# Patient Record
Sex: MALE | Race: WHITE | URBAN - METROPOLITAN AREA
[De-identification: names, ages, dates, MRNs, and addresses within clinical notes are randomized per-mention and may not be internally consistent; named-entity substitution may affect disease eponyms.]

---

## 2021-02-16 ENCOUNTER — HOSPITAL ENCOUNTER (OUTPATIENT)
Dept: RADIOLOGY | Facility: MEDICAL CENTER | Age: 26
Discharge: HOME | End: 2021-02-16
Payer: COMMERCIAL

## 2021-02-16 DIAGNOSIS — R09.81 NASAL CONGESTION: ICD-10-CM

## 2021-02-16 DIAGNOSIS — J34.3 HYPERTROPHY OF INFERIOR NASAL TURBINATE: ICD-10-CM

## 2021-02-16 DIAGNOSIS — J32.2 CHRONIC ETHMOIDAL SINUSITIS: ICD-10-CM

## 2021-02-16 DIAGNOSIS — J34.2 DEVIATED NASAL SEPTUM: ICD-10-CM

## 2021-02-16 DIAGNOSIS — J45.40 MODERATE PERSISTENT ASTHMA WITHOUT COMPLICATION: ICD-10-CM

## 2021-02-16 DIAGNOSIS — J30.89 NON-SEASONAL ALLERGIC RHINITIS DUE TO OTHER ALLERGIC TRIGGER: ICD-10-CM

## 2021-02-16 DIAGNOSIS — J32.1 CHRONIC FRONTAL SINUSITIS: ICD-10-CM

## 2021-02-16 DIAGNOSIS — J33.9 NASAL POLYPOSIS: ICD-10-CM

## 2021-02-16 DIAGNOSIS — J32.3 CHRONIC SPHENOIDAL SINUSITIS: ICD-10-CM

## 2021-02-16 DIAGNOSIS — J32.0 CHRONIC MAXILLARY SINUSITIS: ICD-10-CM

## 2021-02-16 PROCEDURE — 70486 CT MAXILLOFACIAL W/O DYE: CPT

## 2021-02-16 PROCEDURE — G1004 CDSM NDSC: HCPCS

## 2021-03-31 DIAGNOSIS — Z23 ENCOUNTER FOR IMMUNIZATION: ICD-10-CM

## 2021-04-07 ENCOUNTER — IMMUNIZATIONS (OUTPATIENT)
Dept: FAMILY MEDICINE CLINIC | Facility: HOSPITAL | Age: 26
End: 2021-04-07

## 2021-04-07 DIAGNOSIS — Z23 ENCOUNTER FOR IMMUNIZATION: Primary | ICD-10-CM

## 2021-04-07 PROCEDURE — 91300 SARS-COV-2 / COVID-19 MRNA VACCINE (PFIZER-BIONTECH) 30 MCG: CPT

## 2021-04-07 PROCEDURE — 0001A SARS-COV-2 / COVID-19 MRNA VACCINE (PFIZER-BIONTECH) 30 MCG: CPT

## 2021-04-28 ENCOUNTER — IMMUNIZATIONS (OUTPATIENT)
Dept: FAMILY MEDICINE CLINIC | Facility: HOSPITAL | Age: 26
End: 2021-04-28

## 2021-04-28 DIAGNOSIS — Z23 ENCOUNTER FOR IMMUNIZATION: Primary | ICD-10-CM

## 2021-04-28 PROCEDURE — 91300 SARS-COV-2 / COVID-19 MRNA VACCINE (PFIZER-BIONTECH) 30 MCG: CPT

## 2021-04-28 PROCEDURE — 0002A SARS-COV-2 / COVID-19 MRNA VACCINE (PFIZER-BIONTECH) 30 MCG: CPT

## 2022-01-04 ENCOUNTER — TELEMEDICINE (OUTPATIENT)
Dept: FAMILY MEDICINE CLINIC | Facility: CLINIC | Age: 27
End: 2022-01-04
Payer: COMMERCIAL

## 2022-01-04 DIAGNOSIS — J32.9 SINUSITIS, UNSPECIFIED CHRONICITY, UNSPECIFIED LOCATION: Primary | ICD-10-CM

## 2022-01-04 PROCEDURE — 99203 OFFICE O/P NEW LOW 30 MIN: CPT | Performed by: FAMILY MEDICINE

## 2022-01-04 RX ORDER — AZITHROMYCIN 250 MG/1
TABLET, FILM COATED ORAL
Qty: 6 TABLET | Refills: 0 | Status: SHIPPED | OUTPATIENT
Start: 2022-01-04 | End: 2022-01-09

## 2022-01-04 NOTE — ASSESSMENT & PLAN NOTE
Patient with history of recurrent sinusitis reporting symptoms starting 12/29/2021  After discussion with patient, this is unlikely to be COVID  Patient get tested for COVID weekly at his work, last test negative on 12/20/21    Will treat this as the sinusitis infection  Prescribed azithromycin 500 mg daily For 1st day and 250 mg daily for the next 4 days    Symptom onset: 12/29/21  Date of exposure:   Date of positive test: n/a    Symptoms includes: nasal congestion, cough, chest tightness and     Please maintain appropriate social distancing, wear a mask at all public spaces, wash hands frequently and clean surface after use  If you have fever greater than 104° that does not respond to Tylenol, difficulty eating or drinking, difficulty breathing please report to ER for evaluation

## 2022-01-04 NOTE — PROGRESS NOTES
COVID-19 Outpatient Progress Note    Assessment/Plan:    Problem List Items Addressed This Visit        Respiratory    Sinusitis - Primary     Patient with history of recurrent sinusitis reporting symptoms starting 12/29/2021  After discussion with patient, this is unlikely to be COVID  Patient get tested for COVID weekly at his work, last test negative on 12/20/21    Will treat this as the sinusitis infection  Prescribed azithromycin 500 mg daily For 1st day and 250 mg daily for the next 4 days    Symptom onset: 12/29/21  Date of exposure:   Date of positive test: n/a    Symptoms includes: nasal congestion, cough, chest tightness and     Please maintain appropriate social distancing, wear a mask at all public spaces, wash hands frequently and clean surface after use  If you have fever greater than 104° that does not respond to Tylenol, difficulty eating or drinking, difficulty breathing please report to ER for evaluation               Relevant Medications    azithromycin (Zithromax) 250 mg tablet         Disposition:     Patient is fully vaccinated and I recommended self quarantine for 5 days followed by strict mask use for an additional 5 days  If patient were to develop symptoms, they should immediately self isolate and call our office for further guidance  I have spent 15 minutes directly with the patient  Encounter provider Lynette Mcintosh MD    Provider located at [address] [place] [address]  [phone]    Recent Visits  Date Type Provider Dept   01/04/22 Telemedicine Lynette Mcintosh MD  Ver[facility]    Showing recent visits within past 7 days and meeting all other requirements  Future Appointments  No visits were found meeting these conditions    Showing future appointments within next 150 days and meeting all other requirements     This virtual check-in was done via [facility] and patient was informed that this is a secure, HIPAA-compliant platform  He agrees to proceed  Patient agrees to participate in a virtual check in via telephone or video visit instead of presenting to the office to address urgent/immediate medical needs  Patient is aware this is a billable service  After connecting through [facility], the patient was identified by name and date of birth  Monet Cee was informed that this was a telemedicine visit and that the exam was being conducted confidentially over secure lines  My office door was closed  No one else was in the room  Monet Cee acknowledged consent and understanding of privacy and security of the telemedicine visit  I informed the patient that I have reviewed his record in Epic and presented the opportunity for him to ask any questions regarding the visit today  The patient agreed to participate  Verification of patient location:  Patient is located in the following state in which I hold an active license: [place]    Subjective:   Monet Cee is a 23 y o  male who is concerned about COVID-19  Patient's symptoms include nasal congestion, cough, chest tightness and headache  Patient denies fever, chills, fatigue, malaise, rhinorrhea, sore throat, anosmia, loss of taste, shortness of breath, abdominal pain, nausea, vomiting, diarrhea and myalgias       Date of symptom onset: 12/29/2021  COVID-19 vaccination status: Fully vaccinated (primary series)    Exposure:   Contact with a person who is under investigation (PUI) for or who is positive for COVID-19 within the last 14 days?: No    Hospitalized recently for fever and/or lower respiratory symptoms?: No      Currently a healthcare worker that is involved in direct patient care?: No      Works in a special setting where the risk of COVID-19 transmission may be high? (this may include long-term care, correctional and penitentiary facilities; homeless shelters; assisted-living facilities and group homes ): No      Resident in a special setting where the risk of COVID-19 transmission may be high? (this may include long-term care, correctional and intermediate facilities; homeless shelters; assisted-living facilities and group homes ): No      Has baseline asthma, flaring up  Using albuterol inhaler up to once daily  Prior to illness, hardly using albuterol at all  Pt was in contact with individuals with symptoms of "food poisoning" throwing up a lot 12/3/21, 1/1/22  Patient reports frequent sinus infection, sees an ENT  Uses antibiotic last time around spring of 2021  Denies any allergy to antibiotic  Patient get tested for COVID weekly for work, last tested 12/20/21  No results found for: SARSCOV2, [address], SARSCORONAVI, CORONAVIRUSR, [facility]  Past Medical History:   Diagnosis Date    Asthma     Nasal congestion     Sinusitis     Tonsillitis      Past Surgical History:   Procedure Laterality Date    ADENOIDECTOMY      COSMETIC SURGERY      Rhinoplasty    POLYPECTOMY      SEPTOPLASTY      SINUS SURGERY      TONSILLECTOMY       Current Outpatient Medications   Medication Sig Dispense Refill    azithromycin (Zithromax) 250 mg tablet Take 2 tablets (500 mg total) by mouth daily for 1 day, THEN 1 tablet (250 mg total) daily for 4 days  6 tablet 0    cetirizine (ZyrTEC) 10 mg tablet Take 1 tablet (10 mg total) by mouth daily at bedtime 30 tablet 5    fluticasone-vilanterol (BREO ELLIPTA) 100-25 mcg/inh inhaler Inhale 1 puff      montelukast (SINGULAIR) 10 mg tablet Take 1 tablet (10 mg total) by mouth daily 90 tablet 0     No current facility-administered medications for this visit  Allergies   Allergen Reactions    Other      Seasonal       Review of Systems   Constitutional: Negative for chills, fatigue and fever  HENT: Positive for congestion  Negative for rhinorrhea and sore throat  Respiratory: Positive for cough and chest tightness  Negative for shortness of breath      Gastrointestinal: Negative for abdominal pain, diarrhea, nausea and vomiting  Musculoskeletal: Negative for myalgias  Neurological: Positive for headaches  Objective: There were no vitals filed for this visit  Physical Exam  Constitutional:       Appearance: Normal appearance  Comments: Nasal sounding voice   Cardiovascular:      Rate and Rhythm: Normal rate  Neurological:      Mental Status: He is alert  Psychiatric:         Mood and Affect: Mood normal        VIRTUAL VISIT DISCLAIMER    Noelle Carpenter verbally agrees to participate in [employer]  Pt is aware that [employer] could be limited without vital signs or the ability to perform a full hands-on physical Lawrnce [place]ers understands he or the provider may request at any time to terminate the video visit and request the patient to seek care or treatment in person  DOMINGA Mart  Family Medicine    Please excuse any "sound-alike" errors that may have ocurred during the process of dictation  Parts of this note have been dictated and there may be errors present in the transcription process  Thank you

## 2022-01-22 ENCOUNTER — OFFICE VISIT (OUTPATIENT)
Dept: URGENT CARE | Facility: MEDICAL CENTER | Age: 27
End: 2022-01-22
Payer: COMMERCIAL

## 2022-01-22 VITALS — WEIGHT: 170 LBS | HEIGHT: 76 IN | BODY MASS INDEX: 20.7 KG/M2

## 2022-01-22 DIAGNOSIS — J45.21 MILD INTERMITTENT ASTHMA WITH ACUTE EXACERBATION: Primary | ICD-10-CM

## 2022-01-22 PROCEDURE — S9083 URGENT CARE CENTER GLOBAL: HCPCS | Performed by: PHYSICIAN ASSISTANT

## 2022-01-22 PROCEDURE — G0382 LEV 3 HOSP TYPE B ED VISIT: HCPCS | Performed by: PHYSICIAN ASSISTANT

## 2022-01-22 RX ORDER — FLUTICASONE FUROATE AND VILANTEROL TRIFENATATE 100; 25 UG/1; UG/1
1 POWDER RESPIRATORY (INHALATION) DAILY
Qty: 60 BLISTER | Refills: 0 | Status: SHIPPED | OUTPATIENT
Start: 2022-01-22 | End: 2022-04-20

## 2022-01-22 RX ORDER — ALBUTEROL SULFATE 90 UG/1
2 AEROSOL, METERED RESPIRATORY (INHALATION) EVERY 6 HOURS PRN
Qty: 8.5 G | Refills: 0 | Status: SHIPPED | OUTPATIENT
Start: 2022-01-22 | End: 2022-02-11

## 2022-01-22 NOTE — PROGRESS NOTES
[facility]        NAME: April Chacon is a 23 y o  male  : 1995    MRN: [id number]  DATE: 2022  TIME: 11:19 AM    Assessment and Plan   Mild intermittent asthma with acute exacerbation [J45 21]  1  Mild intermittent asthma with acute exacerbation  fluticasone-vilanterol (Breo Ellipta) 100-25 mcg/inh inhaler    albuterol (ProAir HFA) 90 mcg/act inhaler         Patient Instructions     Asthma exacerbation/ cough  breo as directed  Ventolin as needed  Declined covid test at this time  Follow up with PCP in 3-5 days  Proceed to  ER if symptoms worsen  Chief Complaint     Chief Complaint   Patient presents with    Cough     Has asthma and hard to breath past week due to cough and restricted airways          History of Present Illness       52-year-old male presents complaining of nonproductive cough, wheezing, congestion x3 days  Patient states he had similar symptoms with previous episodes of asthma exacerbation  Patient declined COVID test at this time      Review of Systems   Review of Systems   Constitutional: Negative  HENT: Negative  Eyes: Negative  Respiratory: Positive for cough, shortness of breath and wheezing  Negative for apnea, choking, chest tightness and stridor  Cardiovascular: Negative  Negative for chest pain           Current Medications       Current Outpatient Medications:     cetirizine (ZyrTEC) 10 mg tablet, Take 1 tablet (10 mg total) by mouth daily at bedtime, Disp: 30 tablet, Rfl: 5    fluticasone-vilanterol (BREO ELLIPTA) 100-25 mcg/inh inhaler, Inhale 1 puff, Disp: , Rfl:     montelukast (SINGULAIR) 10 mg tablet, Take 1 tablet (10 mg total) by mouth daily, Disp: 90 tablet, Rfl: 0    albuterol (ProAir HFA) 90 mcg/act inhaler, Inhale 2 puffs every 6 (six) hours as needed for wheezing for up to 20 days, Disp: 8 5 g, Rfl: 0    fluticasone-vilanterol (Breo Ellipta) 100-25 mcg/inh inhaler, Inhale 1 puff daily Rinse mouth after use , Disp: 60 blister, Rfl: 0    Current Allergies     Allergies as of 01/22/2022 - Reviewed 01/22/2022   Allergen Reaction Noted    Other  12/30/2020            The following portions of the patient's history were reviewed and updated as appropriate: allergies, current medications, past family history, past medical history, past social history, past surgical history and problem list      Past Medical History:   Diagnosis Date    Asthma     Nasal congestion     Sinusitis     Tonsillitis        Past Surgical History:   Procedure Laterality Date    ADENOIDECTOMY      COSMETIC SURGERY      Rhinoplasty    POLYPECTOMY      SEPTOPLASTY      SINUS SURGERY      TONSILLECTOMY         History reviewed  No pertinent family history  Medications have been verified  Objective   Ht 6' 4" (1 93 m)   Wt 77 1 kg (170 lb)   BMI 20 69 kg/m²        Physical Exam     Physical Exam  Constitutional:       General: He is not in acute distress  Appearance: He is well-developed and normal weight  He is not diaphoretic  Cardiovascular:      Rate and Rhythm: Normal rate and regular rhythm  Heart sounds: Normal heart sounds  Pulmonary:      Effort: Pulmonary effort is normal  No respiratory distress  Breath sounds: Normal breath sounds  No stridor  No wheezing, rhonchi or rales  Chest:      Chest wall: No tenderness  Musculoskeletal:      Cervical back: Normal range of motion and neck supple  Lymphadenopathy:      Cervical: No cervical adenopathy  Neurological:      Mental Status: He is alert

## 2022-01-22 NOTE — PATIENT INSTRUCTIONS
Asthma exacerbation/ cough  breo as directed  Ventolin as needed  Declined covid test at this time  Follow up with PCP in 3-5 days  Proceed to  ER if symptoms worsen

## 2022-02-03 ENCOUNTER — OFFICE VISIT (OUTPATIENT)
Dept: FAMILY MEDICINE CLINIC | Facility: CLINIC | Age: 27
End: 2022-02-03
Payer: COMMERCIAL

## 2022-02-03 VITALS
DIASTOLIC BLOOD PRESSURE: 80 MMHG | HEIGHT: 76 IN | WEIGHT: 169.6 LBS | SYSTOLIC BLOOD PRESSURE: 128 MMHG | BODY MASS INDEX: 20.65 KG/M2 | TEMPERATURE: 97.4 F | HEART RATE: 71 BPM | OXYGEN SATURATION: 99 %

## 2022-02-03 DIAGNOSIS — Z00.00 ANNUAL PHYSICAL EXAM: Primary | ICD-10-CM

## 2022-02-03 DIAGNOSIS — J30.2 SEASONAL ALLERGIC RHINITIS, UNSPECIFIED TRIGGER: ICD-10-CM

## 2022-02-03 DIAGNOSIS — J32.9 SINUSITIS, UNSPECIFIED CHRONICITY, UNSPECIFIED LOCATION: ICD-10-CM

## 2022-02-03 DIAGNOSIS — J45.40 MODERATE PERSISTENT ASTHMA, UNSPECIFIED WHETHER COMPLICATED: ICD-10-CM

## 2022-02-03 PROCEDURE — 99395 PREV VISIT EST AGE 18-39: CPT | Performed by: FAMILY MEDICINE

## 2022-02-03 NOTE — ASSESSMENT & PLAN NOTE
Annual physical exam completed at this time  Baseline [place] PSTF, patient does not require any blood work at this time  Discussed safe sex, drug, alcohol, tobacco use  Patient declined HIV and hepatitis-C screening at this time

## 2022-02-03 NOTE — PATIENT INSTRUCTIONS

## 2022-02-03 NOTE — ASSESSMENT & PLAN NOTE
Improved  Has baseline moderate to severe asthma which is controlled on Breo Ellipta and albuterol  Has not required albuterol inhaler at all for the last 2 weeks  Please continue current medication for asthma control  Will provide referral for allergist for evaluation of chronic sinusitis

## 2022-02-03 NOTE — PROGRESS NOTES
[facility]    NAME: Donna Cheney  AGE: 23 y o  SEX: male  : 1995     DATE: 2/3/2022     Assessment and Plan:     Problem List Items Addressed This Visit        Respiratory    Sinusitis     Improved  Has baseline moderate to severe asthma which is controlled on Breo Ellipta and albuterol  Has not required albuterol inhaler at all for the last 2 weeks  Please continue current medication for asthma control  Will provide referral for allergist for evaluation of chronic sinusitis            Other    Annual physical exam - Primary     Annual physical exam completed at this time  Baseline [place]  [place] PSTF, patient does not require any blood work at this time  Discussed safe sex, drug, alcohol, tobacco use  Patient declined HIV and hepatitis-C screening at this time           Other Visit Diagnoses     Seasonal allergic rhinitis, unspecified trigger        Relevant Orders    Ambulatory Referral to Allergy    Moderate persistent asthma, unspecified whether complicated        Relevant Orders    Ambulatory Referral to Allergy          Immunizations and preventive care screenings were discussed with patient today  Appropriate education was printed on patient's after visit summary  Counseling:  Alcohol/drug use: discussed moderation in alcohol intake, the recommendations for healthy alcohol use, and avoidance of illicit drug use  Dental Health: discussed importance of regular tooth brushing, flossing, and dental visits  Injury prevention: discussed safety/seat belts, safety helmets, smoke detectors, carbon dioxide detectors, and smoking near bedding or upholstery  Sexual health: discussed sexually transmitted diseases, partner selection, use of condoms, avoidance of unintended pregnancy, and contraceptive alternatives  · Exercise: the importance of regular exercise/physical activity was discussed   Recommend exercise 3-5 times per week for at least 30 minutes  No follow-ups on file  Chief Complaint:     Chief Complaint   Patient presents with    Follow-up     to [facility] [place]      History of Present Illness:     Adult Annual Physical   Patient here for a comprehensive physical exam  The patient reports Sinusitis, improving  Diet and Physical Activity  · Diet/Nutrition: well balanced diet and consuming 3-5 servings of fruits/vegetables daily  · Exercise: 1-2 times a week on average  Depression Screening  PHQ-2/9 Depression Screening    Little interest or pleasure in doing things: 0 - not at all  Feeling down, depressed, or hopeless: 0 - not at all  PHQ-2 Score: 0  PHQ-2 Interpretation: Negative depression screen       General Health  · Sleep: sleeps well  · Hearing: normal - bilateral   · Vision: no vision problems  · Dental: no dental visits for >1 year and brushes teeth twice daily   Health  · History of STDs?: no      Review of Systems:     Review of Systems   Constitutional: Negative for appetite change, chills, fever and unexpected weight change  HENT: Positive for congestion, postnasal drip, rhinorrhea, sinus pressure and sinus pain  Negative for sore throat  Respiratory: Positive for cough  Negative for chest tightness and shortness of breath  With asthma flare-up   Cardiovascular: Negative for chest pain  Gastrointestinal: Negative for abdominal pain, constipation, diarrhea, nausea and vomiting  Rare chronic left upper quadrant pain the last few hours, resolved spontaneously  Patient is not bothered by the pain however does not would have a good idea of what is causing the pain   Genitourinary: Negative for dysuria and hematuria  Musculoskeletal: Negative for arthralgias, back pain and myalgias  Skin: Negative for wound  Neurological: Negative for dizziness, light-headedness and headaches           Past Medical History:     Past Medical History:   Diagnosis Date    Asthma     Heart murmur     during childhood    Nasal congestion     Sinusitis     Tonsillitis       Past Surgical History:     Past Surgical History:   Procedure Laterality Date    ADENOIDECTOMY      COSMETIC SURGERY      Rhinoplasty    POLYPECTOMY      SEPTOPLASTY      SINUS SURGERY      TONSILLECTOMY        Social History:     Social History     Socioeconomic History    Marital status: Single     Spouse name: None    Number of children: None    Years of education: None    Highest education level: None   Occupational History    None   Tobacco Use    Smoking status: Never Smoker    Smokeless tobacco: Never Used    Tobacco comment: tried before, less than 1 pack in total   Vaping Use    Vaping Use: Former   Substance and Sexual Activity    Alcohol use: Yes     Comment: 2 drinks a week     Drug use: Yes     Frequency: 3 0 times per week     Types: Marijuana     Comment: sublingual lincture    Sexual activity: Yes     Partners: Female   Other Topics Concern    None   Social History Narrative    Patient feels safe at his home and at work    Has good access to transportation, food medicine    Has carbon monoxide detector, smoke detectors, all with seatbelt    Has history of concussion, always wear a helmet    Does have a gun, understands to sore firearm safety         Social Determinants of Health     Financial Resource Strain: Not on file   Food Insecurity: Not on file   Transportation Needs: Not on file   Physical Activity: Not on file   Stress: Not on file   Social Connections: Not on file   Intimate Partner Violence: Not on file   Housing Stability: Not on file      Family History:     Family History   Problem Relation Age of Onset    Mental illness Mother     Heart disease Father     Hyperlipidemia Father     Hypertension Father     Asthma Father     Diabetes Maternal Grandmother     Asthma Paternal Grandmother       Current Medications:     Current Outpatient Medications   Medication Sig Dispense Refill    albuterol (ProAir HFA) 90 mcg/act inhaler Inhale 2 puffs every 6 (six) hours as needed for wheezing for up to 20 days 8 5 g 0    cetirizine (ZyrTEC) 10 mg tablet Take 1 tablet (10 mg total) by mouth daily at bedtime 30 tablet 5    fluticasone-vilanterol (BREO ELLIPTA) 100-25 mcg/inh inhaler Inhale 1 puff      fluticasone-vilanterol (Breo Ellipta) 100-25 mcg/inh inhaler Inhale 1 puff daily Rinse mouth after use  60 blister 0    montelukast (SINGULAIR) 10 mg tablet Take 1 tablet (10 mg total) by mouth daily 90 tablet 0     No current facility-administered medications for this visit  Allergies: Allergies   Allergen Reactions    Other      Seasonal      Physical Exam:     /80 (BP Location: Right arm, Patient Position: Sitting, Cuff Size: Standard)   Pulse 71   Temp (!) 97 4 °F (36 3 °C)   Ht 6' 4" (1 93 m)   Wt 76 9 kg (169 lb 9 6 oz)   SpO2 99%   BMI 20 64 kg/m²     Physical Exam  Vitals reviewed  Constitutional:       General: He is not in acute distress  Appearance: Normal appearance  He is not ill-appearing, toxic-appearing or diaphoretic  Cardiovascular:      Rate and Rhythm: Normal rate and regular rhythm  Pulses: Normal pulses  Heart sounds: Normal heart sounds  No murmur heard  Pulmonary:      Effort: Pulmonary effort is normal  No respiratory distress  Breath sounds: Wheezing present  Comments: Mild wheezes on left lower field  Abdominal:      General: Abdomen is flat  Bowel sounds are normal  There is no distension  Palpations: Abdomen is soft  There is no mass  Musculoskeletal:         General: No swelling, tenderness, deformity or signs of injury  Normal range of motion  Skin:     General: Skin is warm and dry  Capillary Refill: Capillary refill takes less than 2 seconds  Coloration: Skin is not jaundiced  Neurological:      General: No focal deficit present        Mental Status: He is alert and oriented to person, place, and time     Psychiatric:         Mood and Affect: Mood normal           MD Genna Alanis

## 2022-04-20 ENCOUNTER — OFFICE VISIT (OUTPATIENT)
Dept: FAMILY MEDICINE CLINIC | Facility: CLINIC | Age: 27
End: 2022-04-20
Payer: COMMERCIAL

## 2022-04-20 VITALS
HEART RATE: 59 BPM | HEIGHT: 76 IN | TEMPERATURE: 97.7 F | OXYGEN SATURATION: 98 % | BODY MASS INDEX: 20.95 KG/M2 | WEIGHT: 172 LBS | SYSTOLIC BLOOD PRESSURE: 122 MMHG | DIASTOLIC BLOOD PRESSURE: 76 MMHG | RESPIRATION RATE: 16 BRPM

## 2022-04-20 DIAGNOSIS — J45.21 MILD INTERMITTENT ASTHMA WITH ACUTE EXACERBATION: ICD-10-CM

## 2022-04-20 PROCEDURE — 99213 OFFICE O/P EST LOW 20 MIN: CPT | Performed by: FAMILY MEDICINE

## 2022-04-20 RX ORDER — ALBUTEROL SULFATE 90 UG/1
2 AEROSOL, METERED RESPIRATORY (INHALATION) EVERY 6 HOURS PRN
Qty: 8 G | Refills: 2 | Status: SHIPPED | OUTPATIENT
Start: 2022-04-20

## 2022-04-20 RX ORDER — FLUTICASONE FUROATE AND VILANTEROL TRIFENATATE 100; 25 UG/1; UG/1
1 POWDER RESPIRATORY (INHALATION) DAILY
Qty: 60 BLISTER | Refills: 0 | Status: SHIPPED | OUTPATIENT
Start: 2022-04-20 | End: 2022-04-20

## 2022-04-20 RX ORDER — PREDNISONE 20 MG/1
40 TABLET ORAL DAILY
Qty: 10 TABLET | Refills: 0 | Status: SHIPPED | OUTPATIENT
Start: 2022-04-20 | End: 2022-04-25

## 2022-04-20 RX ORDER — FLUTICASONE FUROATE AND VILANTEROL TRIFENATATE 100; 25 UG/1; UG/1
1 POWDER RESPIRATORY (INHALATION) DAILY
Qty: 60 BLISTER | Refills: 3 | Status: SHIPPED | OUTPATIENT
Start: 2022-04-20 | End: 2022-08-18

## 2022-04-20 NOTE — PROGRESS NOTES
Assessment/Plan:    Asthma  Asthma with recent exacerbation may be secondary to allergies versus mild respiratory viral infection  Will refill albuterol inhaler as well as Breo Ellipta at this time  Start patient on 5 day course of prednisone  Patient may take albuterol inhaler twice a day on schedule for the next week  Go back to using as needed afterwards      Subjective:      Patient ID: Brendan Cuellar is a 23 y o  male  HPI    52-year-old male patient presents for 4 week history of ongoing asthma exacerbation  Patient reports over the last week, he noticed his asthma symptom is worsening and decided to follow-up  He has not been taking his Breo Ellipta medication due to discontinuation of the prescription  However has been compliant with the montelukast and had been taking Xyzal for his allergy symptoms  Patient has been using albuterol inhaler up to twice a day  Patient has both chest tightness and coughing as symptom of asthma, does improve with albuterol inhaler  Patient did have productive cough and more postnasal drip recently  Review of Systems   Constitutional: Negative for chills and fever  HENT: Positive for congestion, postnasal drip, rhinorrhea and sore throat  Negative for voice change  Respiratory: Positive for cough and chest tightness  Negative for shortness of breath  Cardiovascular: Negative for chest pain  Gastrointestinal: Negative for abdominal pain, constipation, diarrhea, nausea and vomiting  Allergic/Immunologic: Positive for environmental allergies  Neurological: Positive for headaches (from coughing)  Objective:    /76 (BP Location: Right arm, Patient Position: Sitting, Cuff Size: Standard)   Pulse 59   Temp 97 7 °F (36 5 °C) (Temporal)   Resp 16   Ht 6' 4" (1 93 m)   Wt 78 kg (172 lb)   SpO2 98%   BMI 20 94 kg/m²       Physical Exam  Vitals reviewed  Constitutional:       General: He is not in acute distress       Appearance: Normal appearance  He is normal weight  He is not ill-appearing, toxic-appearing or diaphoretic  Cardiovascular:      Rate and Rhythm: Normal rate and regular rhythm  Pulses: Normal pulses  Heart sounds: Normal heart sounds  No murmur heard  Pulmonary:      Effort: Pulmonary effort is normal       Comments: Mild wheezing appreciate all lung fields  Abdominal:      General: Abdomen is flat  Bowel sounds are normal  There is no distension  Musculoskeletal:         General: No swelling, tenderness, deformity or signs of injury  Normal range of motion  Skin:     General: Skin is warm and dry  Capillary Refill: Capillary refill takes less than 2 seconds  Neurological:      General: No focal deficit present  Mental Status: He is alert  Psychiatric:         Mood and Affect: Mood normal             DOMINGA Allan  Family Medicine    Please excuse any "sound-alike" errors that may have ocurred during the process of dictation  Parts of this note have been dictated and there may be errors present in the transcription process  Thank you

## 2022-04-20 NOTE — ASSESSMENT & PLAN NOTE
Asthma with recent exacerbation may be secondary to allergies versus mild respiratory viral infection  Will refill albuterol inhaler as well as Darlene Boyle at this time  Start patient on 5 day course of prednisone  Patient may take albuterol inhaler twice a day on schedule for the next week  Go back to using as needed afterwards

## 2022-11-11 DIAGNOSIS — J45.21 MILD INTERMITTENT ASTHMA WITH ACUTE EXACERBATION: ICD-10-CM

## 2022-11-11 RX ORDER — ALBUTEROL SULFATE 90 UG/1
2 AEROSOL, METERED RESPIRATORY (INHALATION) EVERY 6 HOURS PRN
Qty: 8 G | Refills: 0 | Status: SHIPPED | OUTPATIENT
Start: 2022-11-11

## 2023-01-03 ENCOUNTER — OFFICE VISIT (OUTPATIENT)
Dept: URGENT CARE | Facility: MEDICAL CENTER | Age: 28
End: 2023-01-03

## 2023-01-03 VITALS
HEIGHT: 76 IN | WEIGHT: 175 LBS | HEART RATE: 74 BPM | SYSTOLIC BLOOD PRESSURE: 144 MMHG | RESPIRATION RATE: 18 BRPM | TEMPERATURE: 98.3 F | DIASTOLIC BLOOD PRESSURE: 68 MMHG | OXYGEN SATURATION: 96 % | BODY MASS INDEX: 21.31 KG/M2

## 2023-01-03 DIAGNOSIS — S46.211A BICEPS STRAIN, RIGHT, INITIAL ENCOUNTER: Primary | ICD-10-CM

## 2023-01-03 RX ORDER — LEVOCETIRIZINE DIHYDROCHLORIDE 5 MG/1
5 TABLET, FILM COATED ORAL
COMMUNITY

## 2023-01-03 RX ORDER — NAPROXEN 500 MG/1
500 TABLET ORAL 2 TIMES DAILY WITH MEALS
Qty: 30 TABLET | Refills: 0 | Status: SHIPPED | OUTPATIENT
Start: 2023-01-03

## 2023-01-03 NOTE — PATIENT INSTRUCTIONS
1  Apply ice compresses to the injured area 3-4 times daily for 20-30 minutes for the next 3-4 days then convert to heat in the same regimen until symptoms resolve  2  Perform range-of-motion/stretches as demonstrated 4 times daily, 5-6 reps  3  Go to the ER if your symptoms significantly worsen  4  Follow-up with physical therapy as soon as possible: Referral has been placed for you

## 2023-01-03 NOTE — PROGRESS NOTES
[facility]        NAME: Florina Payne is a 33 y o  male  : 1995    MRN: [id number]  DATE: January 3, 2023  TIME: 6:00 PM    Assessment and Plan   Biceps strain, right, initial encounter [[id number] [id number]]  1  Biceps strain, right, initial encounter  Ambulatory Referral to Physical Therapy    naproxen (Naprosyn) 500 mg tablet            Patient Instructions   1  Apply ice compresses to the injured area 3-4 times daily for 20-30 minutes for the next 3-4 days then convert to heat in the same regimen until symptoms resolve  2  Perform range-of-motion/stretches as demonstrated 4 times daily, 5-6 reps  3  Go to the ER if your symptoms significantly worsen  4  Follow-up with physical therapy as soon as possible: Referral has been placed for you  Chief Complaint     Chief Complaint   Patient presents with   • Arm Pain     Pt  C/O right arm pain that began the day after he worked out in [employer]( about a week ago )          History of Present Illness       13-year-old male patient with an approximate 1 week history of right upper arm and elbow pain which began 1 day after working out with heavy upper body exercises  He indicates the pain is in the belly of the biceps muscle as well as the distal biceps tendon  Pain is worsened with flexion of the elbow and supination/pronation  He denies any sensory changes  Denies any subjective change in  strength  Denies any other complaints  Review of Systems   Review of Systems   Constitutional: Negative for chills and fever  HENT: Negative for ear pain and sore throat  Eyes: Negative for pain and visual disturbance  Respiratory: Negative for cough and shortness of breath  Cardiovascular: Negative for chest pain and palpitations  Gastrointestinal: Negative for abdominal pain and vomiting  Genitourinary: Negative for dysuria and hematuria  Musculoskeletal: Positive for myalgias  Negative for arthralgias and back pain     Skin: Negative for color change and rash  Neurological: Negative for seizures and syncope  All other systems reviewed and are negative          Current Medications       Current Outpatient Medications:   •  albuterol (Ventolin HFA) 90 mcg/act inhaler, Inhale 2 puffs every 6 (six) hours as needed for wheezing or shortness of breath, Disp: 8 g, Rfl: 0  •  cetirizine (ZyrTEC) 10 mg tablet, Take 1 tablet (10 mg total) by mouth daily at bedtime, Disp: 30 tablet, Rfl: 5  •  levocetirizine (XYZAL) 5 MG tablet, Take 5 mg by mouth, Disp: , Rfl:   •  Mometasone Furoate POWD, Compound Mometasone 1 mg capsule to be added to sinus rinse twice daily, Disp: 180 g, Rfl: 3  •  montelukast (SINGULAIR) 10 mg tablet, TAKE ONE TABLET BY MOUTH ONCE A DAY , Disp: 90 tablet, Rfl: 0  •  naproxen (Naprosyn) 500 mg tablet, Take 1 tablet (500 mg total) by mouth 2 (two) times a day with meals, Disp: 30 tablet, Rfl: 0  •  fluticasone-vilanterol (Breo Ellipta) 100-25 mcg/inh inhaler, Inhale 1 puff daily Rinse mouth after use , Disp: 60 blister, Rfl: 3    Current Allergies     Allergies as of 01/03/2023 - Reviewed 01/03/2023   Allergen Reaction Noted   • Other  12/30/2020            The following portions of the patient's history were reviewed and updated as appropriate: allergies, current medications, past family history, past medical history, past social history, past surgical history and problem list      Past Medical History:   Diagnosis Date   • Asthma    • Heart murmur     during childhood   • Nasal congestion    • Sinusitis    • Tonsillitis        Past Surgical History:   Procedure Laterality Date   • ADENOIDECTOMY     • COSMETIC SURGERY      Rhinoplasty   • POLYPECTOMY     • SEPTOPLASTY     • SINUS SURGERY     • TONSILLECTOMY         Family History   Problem Relation Age of Onset   • Mental illness Mother    • Heart disease Father    • Hyperlipidemia Father    • Hypertension Father    • Asthma Father    • Diabetes Maternal Grandmother    • Asthma Paternal Grandmother          Medications have been verified  Objective   /68   Pulse 74   Temp 98 3 °F (36 8 °C)   Resp 18   Ht 6' 4" (1 93 m)   Wt 79 4 kg (175 lb)   SpO2 96%   BMI 21 30 kg/m²        Physical Exam     Physical Exam  Vitals and nursing note reviewed  Constitutional:       Appearance: Normal appearance  HENT:      Head: Normocephalic  Nose: Nose normal       Mouth/Throat:      Mouth: Mucous membranes are dry  Pharynx: Oropharynx is clear  Eyes:      Conjunctiva/sclera: Conjunctivae normal       Pupils: Pupils are equal, round, and reactive to light  Cardiovascular:      Rate and Rhythm: Normal rate and regular rhythm  Pulses: Normal pulses  Pulmonary:      Effort: Pulmonary effort is normal       Breath sounds: Normal breath sounds  Musculoskeletal:      Cervical back: Normal range of motion and neck supple  Comments: The belly of right biceps in the distal biceps tendon is slightly tender to palpation  No palpable spasm  No deformity/Chuck deformity noted  Full range of motion is noted with increase in symptoms with supination/pronation and flexion of the elbow  Distal neurovascular exam is normal   Okay sign and thumb to pinky sign is normal   5 out of 5 strength, 3+ reflexes  Strength is normal    Skin:     General: Skin is warm and dry  Capillary Refill: Capillary refill takes less than 2 seconds  Neurological:      General: No focal deficit present  Mental Status: He is alert and oriented to person, place, and time     Psychiatric:         Mood and Affect: Mood normal          Behavior: Behavior normal

## 2023-01-10 ENCOUNTER — EVALUATION (OUTPATIENT)
Dept: PHYSICAL THERAPY | Facility: MEDICAL CENTER | Age: 28
End: 2023-01-10

## 2023-01-10 DIAGNOSIS — S46.211D BICEPS STRAIN, RIGHT, SUBSEQUENT ENCOUNTER: ICD-10-CM

## 2023-01-10 NOTE — PROGRESS NOTES
PT Evaluation     Today's date: 1/10/2023  Patient name: Sunny Connell  : 1995  MRN: [id number]  Referring provider: Cindy Aguilar, *  Dx:   Encounter Diagnosis     ICD-10-CM    1  Biceps strain, right, subsequent encounter  S46 211D Ambulatory Referral to Physical Therapy          Start Time:   Stop Time: 182  Total time in clinic (min): 35 minutes    Assessment  Assessment details: Pt is a 33 y o male who presents with signs and symptoms consistent referring Dx of bicep strain with history of increased pain in the R bicep, decreased ROM, and decreased strength following a weight lifting workout involving heavy bicep activity  Today the patient presents with no pain, full ROM of the elbow and slight decrease in R bicep strength when compare to the other side  These impairments limit the patient from participating in working out at [facility], decreased tolerance to lifting and carrying and decreased ability to participate in the community  Secondary to patient reporting decrease in symptoms, full ROM, and good tolerance to HEP performed today (also considering cost of PT for this patient) pt and therapist agreed for patient to follow HEP and begin to slowly progress bicep strengthening independently of PT  Pt was educated about HEP and progression to take when strengthening biceps  Pt was educated to avoid pain  We have made a follow up appointment in 4 weeks pending need for follow up  Pt was educated that he may contact the clinic to move appointment up with adverse symptom production  I believe this patient is a good candidate for and will benefit from skilled physical therapy for manual therapy to the R bicep, R elbow ROM exercises, R elbow strengthening exercises and mechanics training to improve symptoms and assist the patient to return to OF  Thank you for the opportunity to participate in [address] [place] [address]      Positive Prognostic Indicators: desire to improve, improving symptoms    Negative Prognostic Indicators: high cost for PT  Impairments: abnormal movement, activity intolerance, impaired physical strength and lacks appropriate home exercise program    Symptom irritability: lowUnderstanding of Dx/Px/POC: good   Prognosis: good    Goals  STGs: 4 weeks  1) Pt will have SPR decrease of 2 units at worst  2) pt will have improved R elbow flexion strength to 5/5  3) pt will have improved foto score of 10 points    LTGs: 8 weeks  1) pt will be independent with HEP by D/C  2) pt will be independent with symptom management by D/C  3) pt will subjectively report return to weight lifting with no more than 2/10 pain in the R bicep in order to demonstrate improved symptom management by DC  Plan  Patient would benefit from: skilled physical therapy  Planned modality interventions: cryotherapy and thermotherapy: hydrocollator packs  Planned therapy interventions: joint mobilization, manual therapy, neuromuscular re-education, strengthening, stretching, therapeutic activities, therapeutic exercise, patient education and home exercise program  Frequency: 1x week  Duration in weeks: 12  Plan of Care beginning date: 1/10/2023  Plan of Care expiration date: 4/4/2023  Treatment plan discussed with: patient        Subjective Evaluation    History of Present Illness  Mechanism of injury: Subjective Comments: pt reports that he was working out without complications  He did not have popping, tearing, or anything  Pt reports that the next day he had significant R bicep pain where he couldn't extend it  He iced it for a couple days  This improved however he sought out medical attention  Who was referred to PT  Pt reports that he is "fine now" but he notices some discomfort in the R bicep  Denies N&T and denies brushing       Pain   Rest: 0/10   Best: 0/10   Worst: 7/10    Relieving Factors: ice, resting, stretches, ROM    Exacerbating Factors: n/a    Sleeping: independent    Home Set-up: carrying a basket at the grocery store    ADLs: independent     Work/Hobbies: weight lifting    Previous Treatment: n/a     Goals:  Return to weight lifting             Objective     Palpation     Right   Tenderness of the brachialis, brachioradialis and wrist flexors       Active Range of Motion     Right Elbow   Flexion: WFL  Extension: WFL  Forearm supination: WFL  Forearm pronation: WFL    Passive Range of Motion     Right Elbow   Flexion: WFL  Extension: WFL  Forearm supination: WFL  Forearm pronation: [facility]    Strength/Myotome Testing     Left Elbow   Flexion: 5  Extension: 5  Forearm supination: 5  Forearm pronation: 5    Right Elbow   Flexion: 4+  Extension: 5  Forearm supination: 4+  Forearm pronation: 4+             Precautions: universal      Manuals 1/10            IASTM R yukiep RK                                                   Neuro Re-Ed                                                                                                        Ther Ex             Bicep curls blk 2x10            Bicep cane stretch 10"x10            Band supination blk x10                                                                             Ther Activity                                       Gait Training                                       Modalities

## 2023-01-20 DIAGNOSIS — J34.3 HYPERTROPHY OF INFERIOR NASAL TURBINATE: ICD-10-CM

## 2023-01-20 DIAGNOSIS — J32.1 CHRONIC FRONTAL SINUSITIS: ICD-10-CM

## 2023-01-20 DIAGNOSIS — R09.81 NASAL CONGESTION: ICD-10-CM

## 2023-01-20 DIAGNOSIS — J32.3 CHRONIC SPHENOIDAL SINUSITIS: ICD-10-CM

## 2023-01-20 DIAGNOSIS — J45.40 MODERATE PERSISTENT ASTHMA WITHOUT COMPLICATION: ICD-10-CM

## 2023-01-20 DIAGNOSIS — J32.0 CHRONIC MAXILLARY SINUSITIS: ICD-10-CM

## 2023-01-20 DIAGNOSIS — J33.9 NASAL POLYPOSIS: ICD-10-CM

## 2023-01-20 DIAGNOSIS — J30.89 NON-SEASONAL ALLERGIC RHINITIS DUE TO OTHER ALLERGIC TRIGGER: ICD-10-CM

## 2023-01-20 DIAGNOSIS — J34.2 DEVIATED NASAL SEPTUM: ICD-10-CM

## 2023-01-20 DIAGNOSIS — J32.2 CHRONIC ETHMOIDAL SINUSITIS: ICD-10-CM

## 2023-01-20 RX ORDER — MONTELUKAST SODIUM 10 MG/1
10 TABLET ORAL DAILY
Qty: 30 TABLET | Refills: 0 | Status: SHIPPED | OUTPATIENT
Start: 2023-01-20

## 2023-02-02 ENCOUNTER — OFFICE VISIT (OUTPATIENT)
Dept: FAMILY MEDICINE CLINIC | Facility: CLINIC | Age: 28
End: 2023-02-02

## 2023-02-02 VITALS
HEIGHT: 75 IN | OXYGEN SATURATION: 98 % | RESPIRATION RATE: 16 BRPM | HEART RATE: 69 BPM | TEMPERATURE: 98.4 F | DIASTOLIC BLOOD PRESSURE: 66 MMHG | BODY MASS INDEX: 21.51 KG/M2 | WEIGHT: 173 LBS | SYSTOLIC BLOOD PRESSURE: 132 MMHG

## 2023-02-02 DIAGNOSIS — J45.21 MILD INTERMITTENT ASTHMA WITH ACUTE EXACERBATION: ICD-10-CM

## 2023-02-02 DIAGNOSIS — Z00.00 ANNUAL PHYSICAL EXAM: Primary | ICD-10-CM

## 2023-02-02 RX ORDER — PREDNISONE 20 MG/1
40 TABLET ORAL DAILY
Qty: 10 TABLET | Refills: 0 | Status: SHIPPED | OUTPATIENT
Start: 2023-02-02 | End: 2023-02-07

## 2023-02-02 RX ORDER — PREDNISONE 20 MG/1
40 TABLET ORAL DAILY
Qty: 10 TABLET | Refills: 0 | Status: SHIPPED | OUTPATIENT
Start: 2023-02-02 | End: 2023-02-02

## 2023-02-02 RX ORDER — ALBUTEROL SULFATE 90 UG/1
2 AEROSOL, METERED RESPIRATORY (INHALATION) EVERY 6 HOURS PRN
Qty: 8 G | Refills: 3 | Status: SHIPPED | OUTPATIENT
Start: 2023-02-02 | End: 2023-02-02

## 2023-02-02 RX ORDER — FLUTICASONE PROPIONATE AND SALMETEROL 100; 50 UG/1; UG/1
1 POWDER RESPIRATORY (INHALATION) 2 TIMES DAILY
Qty: 180 BLISTER | Refills: 1 | Status: SHIPPED | OUTPATIENT
Start: 2023-02-02 | End: 2023-02-02

## 2023-02-02 RX ORDER — FLUTICASONE PROPIONATE AND SALMETEROL 100; 50 UG/1; UG/1
1 POWDER RESPIRATORY (INHALATION) 2 TIMES DAILY
Qty: 180 BLISTER | Refills: 1 | Status: SHIPPED | OUTPATIENT
Start: 2023-02-02

## 2023-02-02 RX ORDER — ALBUTEROL SULFATE 90 UG/1
2 AEROSOL, METERED RESPIRATORY (INHALATION) EVERY 6 HOURS PRN
Qty: 16 G | Refills: 2 | Status: SHIPPED | OUTPATIENT
Start: 2023-02-02

## 2023-02-02 NOTE — PROGRESS NOTES
[facility]    NAME: Fuentes Smith  AGE: 33 y o  SEX: male  : 1995     DATE: 2023     Assessment and Plan:     Problem List Items Addressed This Visit        Other    Annual physical exam - Primary       Immunizations and preventive care screenings were discussed with patient today  Appropriate education was printed on patient's after visit summary  Counseling:  Alcohol/drug use: discussed moderation in alcohol intake, the recommendations for healthy alcohol use, and avoidance of illicit drug use  Dental Health: discussed importance of regular tooth brushing, flossing, and dental visits  Injury prevention: discussed safety/seat belts, safety helmets, smoke detectors, carbon dioxide detectors, and smoking near bedding or upholstery  Sexual health: discussed sexually transmitted diseases, partner selection, use of condoms, avoidance of unintended pregnancy, and contraceptive alternatives  · Exercise: the importance of regular exercise/physical activity was discussed  Recommend exercise 3-5 times per week for at least 30 minutes  No follow-ups on file  Chief Complaint:     Chief Complaint   Patient presents with   • Annual Exam      History of Present Illness:     Adult Annual Physical   Patient here for a comprehensive physical exam  The patient reports problems - asthma there is a patient over the last 3 weeks  Patient has been using his albuterol more frequently, up to 2-3 times a day each day, patient no longer taking controlled medication  Continues to use montelukast on a daily basis  Patient reported pain sick roughly 3 weeks ago with URI symptoms, symptoms slowly improving  A    Diet and Physical Activity  · Diet/Nutrition: well balanced diet and consuming 3-5 servings of fruits/vegetables daily  · Exercise: 3-4 times a week on average        Depression Screening  PHQ-2/9 Depression Screening Little interest or pleasure in doing things: 0 - not at all  Feeling down, depressed, or hopeless: 1 - several days  PHQ-2 Score: 1  PHQ-2 Interpretation: Negative depression screen       General Health  · Sleep: sleeps well  · Hearing: normal - bilateral   · Vision: no vision problems  · Dental: regular dental visits and brushes teeth twice daily   Health  · History of STDs?: no   Monogamous with one partner     Review of Systems:     Review of Systems   Constitutional: Positive for fever  Negative for chills  Temperature for 2 days 3 weeks ago during URI symptoms   HENT: Positive for congestion, rhinorrhea and sore throat  Respiratory: Positive for cough, chest tightness and wheezing  Negative for shortness of breath  URI symptoms improving, cough improving, wheezing still persistent with chest tightness   Cardiovascular: Negative for chest pain  Gastrointestinal: Negative for abdominal pain  Neurological: Negative for headaches  Psychiatric/Behavioral: Negative for sleep disturbance        Past Medical History:     Past Medical History:   Diagnosis Date   • Asthma    • Heart murmur     during childhood   • Nasal congestion    • Sinusitis    • Tonsillitis       Past Surgical History:     Past Surgical History:   Procedure Laterality Date   • ADENOIDECTOMY     • COSMETIC SURGERY      Rhinoplasty   • POLYPECTOMY     • SEPTOPLASTY     • SINUS SURGERY     • TONSILLECTOMY        Social History:     Social History     Socioeconomic History   • Marital status: Single     Spouse name: None   • Number of children: None   • Years of education: None   • Highest education level: None   Occupational History   • None   Tobacco Use   • Smoking status: Never   • Smokeless tobacco: Never   • Tobacco comments:     tried before, less than 1 pack in total   Vaping Use   • Vaping Use: Former   Substance and Sexual Activity   • Alcohol use: Yes     Comment: 2 drinks a week    • Drug use: Not Currently     Frequency: 3 0 times per week     Types: Marijuana     Comment: sublingual lincture   • Sexual activity: Yes     Partners: Female   Other Topics Concern   • None   Social History Narrative    Patient feels safe at his home and at work    Has good access to transportation, food medicine    Has carbon monoxide detector, smoke detectors, all with seatbelt    Has history of concussion, always wear a helmet    Does have a gun, understands to sore firearm safety         Social Determinants of Health     Financial Resource Strain: Not on file   Food Insecurity: Not on file   Transportation Needs: Not on file   Physical Activity: Not on file   Stress: Not on file   Social Connections: Not on file   Intimate Partner Violence: Not on file   Housing Stability: Not on file      Family History:     Family History   Problem Relation Age of Onset   • Mental illness Mother    • Heart disease Father    • Hyperlipidemia Father    • Hypertension Father    • Asthma Father    • Diabetes Maternal Grandmother    • Asthma Paternal Grandmother       Current Medications:     Current Outpatient Medications   Medication Sig Dispense Refill   • albuterol (Ventolin HFA) 90 mcg/act inhaler Inhale 2 puffs every 6 (six) hours as needed for wheezing or shortness of breath 8 g 0   • guaiFENesin (MUCINEX PO) Take by mouth     • levocetirizine (XYZAL) 5 MG tablet Take 5 mg by mouth     • Mometasone Furoate POWD Compound Mometasone 1 mg capsule to be added to sinus rinse twice daily 180 g 3   • montelukast (SINGULAIR) 10 mg tablet Take 1 tablet (10 mg total) by mouth daily 90 tablet 0   • cetirizine (ZyrTEC) 10 mg tablet Take 1 tablet (10 mg total) by mouth daily at bedtime 30 tablet 5   • fluticasone-vilanterol (Breo Ellipta) 100-25 mcg/inh inhaler Inhale 1 puff daily Rinse mouth after use   60 blister 3   • montelukast (SINGULAIR) 10 mg tablet Take 1 tablet (10 mg total) by mouth daily 30 tablet 0   • naproxen (Naprosyn) 500 mg tablet Take 1 tablet (500 mg total) by mouth 2 (two) times a day with meals 30 tablet 0     No current facility-administered medications for this visit  Allergies: Allergies   Allergen Reactions   • Other      Seasonal      Physical Exam:     /66 (BP Location: Right arm, Patient Position: Sitting, Cuff Size: Standard)   Pulse 69   Temp 98 4 °F (36 9 °C) (Temporal)   Resp 16   Ht 6' 2 75" (1 899 m)   Wt 78 5 kg (173 lb)   SpO2 98%   BMI 21 77 kg/m²     Physical Exam  Vitals reviewed  Constitutional:       General: He is not in acute distress  Appearance: Normal appearance  He is not ill-appearing, toxic-appearing or diaphoretic  Cardiovascular:      Rate and Rhythm: Normal rate and regular rhythm  Pulses: Normal pulses  Heart sounds: Normal heart sounds  No murmur heard  Pulmonary:      Effort: Pulmonary effort is normal       Breath sounds: Wheezing present  Abdominal:      General: Abdomen is flat  Bowel sounds are normal  There is no distension  Palpations: Abdomen is soft  Musculoskeletal:         General: No swelling, tenderness, deformity or signs of injury  Normal range of motion  Skin:     General: Skin is warm and dry  Capillary Refill: Capillary refill takes less than 2 seconds  Coloration: Skin is not jaundiced  Neurological:      General: No focal deficit present  Mental Status: He is alert and oriented to person, place, and time     Psychiatric:         Mood and Affect: Mood normal           MD [facility]

## 2023-02-02 NOTE — PATIENT INSTRUCTIONS

## 2023-02-02 NOTE — PROGRESS NOTES
Name: Jen Solorzano      : 1995      MRN: [id number]  Encounter Provider: Easton Workman MD  Encounter Date: 2023   Encounter department: [address] [place] [address]  Annual physical exam    2  Mild intermittent asthma with acute exacerbation  -     Fluticasone-Salmeterol (Advair) 100-50 mcg/dose inhaler; Inhale 1 puff 2 (two) times a day Rinse mouth after use  -     predniSONE 20 mg tablet; Take 2 tablets (40 mg total) by mouth daily for 5 days    Asthma exacerbation likely secondary to URI symptoms 3 weeks ago, symptoms are improving however patient continues to have asthma exacerbation, will start patient on oral steroid, prednisone 40 mg for total 5 days, afterwards please start Advair inhaler 1 puff twice a day  Refill albuterol inhaler, will provide patient was to rescue inhaler to keep at home and at work       Subjective      HPI     Patient here for a comprehensive physical exam  The patient reports problems - asthma there is a patient over the last 3 weeks  Patient has been using his albuterol more frequently, up to 2-3 times a day each day, patient no longer taking controlled medication  Continues to use montelukast on a daily basis  Patient reported pain sick roughly 3 weeks ago with URI symptoms, symptoms slowly improving  Review of Systems   Constitutional: Positive for fever  Negative for chills  Temperature for 2 days 3 weeks ago during URI symptoms   HENT: Positive for congestion, rhinorrhea and sore throat  Respiratory: Positive for cough, chest tightness and wheezing  Negative for shortness of breath  URI symptoms improving, cough improving, wheezing still persistent with chest tightness   Cardiovascular: Negative for chest pain  Gastrointestinal: Negative for abdominal pain  Neurological: Negative for headaches  Psychiatric/Behavioral: Negative for sleep disturbance       Current Outpatient Medications on File Prior to Visit   Medication Sig   • albuterol (Ventolin HFA) 90 mcg/act inhaler Inhale 2 puffs every 6 (six) hours as needed for wheezing or shortness of breath   • guaiFENesin (MUCINEX PO) Take by mouth   • levocetirizine (XYZAL) 5 MG tablet Take 5 mg by mouth   • Mometasone Furoate POWD Compound Mometasone 1 mg capsule to be added to sinus rinse twice daily   • montelukast (SINGULAIR) 10 mg tablet Take 1 tablet (10 mg total) by mouth daily   • cetirizine (ZyrTEC) 10 mg tablet Take 1 tablet (10 mg total) by mouth daily at bedtime   • montelukast (SINGULAIR) 10 mg tablet Take 1 tablet (10 mg total) by mouth daily   • naproxen (Naprosyn) 500 mg tablet Take 1 tablet (500 mg total) by mouth 2 (two) times a day with meals   • [DISCONTINUED] fluticasone-vilanterol (Breo Ellipta) 100-25 mcg/inh inhaler Inhale 1 puff daily Rinse mouth after use  Objective     /66 (BP Location: Right arm, Patient Position: Sitting, Cuff Size: Standard)   Pulse 69   Temp 98 4 °F (36 9 °C) (Temporal)   Resp 16   Ht 6' 2 75" (1 899 m)   Wt 78 5 kg (173 lb)   SpO2 98%   BMI 21 77 kg/m²     Physical Exam  Vitals reviewed  Constitutional:       General: He is not in acute distress  Appearance: Normal appearance  He is not ill-appearing, toxic-appearing or diaphoretic  Cardiovascular:      Rate and Rhythm: Normal rate and regular rhythm  Pulses: Normal pulses  Heart sounds: Normal heart sounds  No murmur heard  Pulmonary:      Effort: Pulmonary effort is normal       Breath sounds: Wheezing present  Abdominal:      General: Abdomen is flat  Bowel sounds are normal  There is no distension  Palpations: Abdomen is soft  Musculoskeletal:         General: No swelling, tenderness, deformity or signs of injury  Normal range of motion  Skin:     General: Skin is warm and dry  Capillary Refill: Capillary refill takes less than 2 seconds  Coloration: Skin is not jaundiced     Neurological:      General: No focal deficit present  Mental Status: He is alert and oriented to person, place, and time     Psychiatric:         Mood and Affect: Mood normal      Billy Logan MD

## 2023-08-03 ENCOUNTER — OFFICE VISIT (OUTPATIENT)
Dept: FAMILY MEDICINE CLINIC | Facility: CLINIC | Age: 28
End: 2023-08-03
Payer: COMMERCIAL

## 2023-08-03 VITALS
SYSTOLIC BLOOD PRESSURE: 106 MMHG | OXYGEN SATURATION: 98 % | WEIGHT: 175 LBS | HEIGHT: 75 IN | HEART RATE: 71 BPM | RESPIRATION RATE: 16 BRPM | BODY MASS INDEX: 21.76 KG/M2 | DIASTOLIC BLOOD PRESSURE: 60 MMHG | TEMPERATURE: 98 F

## 2023-08-03 DIAGNOSIS — R43.0 ANOSMIA: ICD-10-CM

## 2023-08-03 DIAGNOSIS — J45.21 MILD INTERMITTENT ASTHMA WITH ACUTE EXACERBATION: Primary | ICD-10-CM

## 2023-08-03 PROCEDURE — 99214 OFFICE O/P EST MOD 30 MIN: CPT | Performed by: FAMILY MEDICINE

## 2023-08-03 RX ORDER — FLUTICASONE PROPIONATE 110 UG/1
1 AEROSOL, METERED RESPIRATORY (INHALATION)
COMMUNITY
End: 2023-08-03

## 2023-08-03 RX ORDER — FLUTICASONE PROPIONATE AND SALMETEROL 100; 50 UG/1; UG/1
1 POWDER RESPIRATORY (INHALATION) 2 TIMES DAILY
Qty: 180 BLISTER | Refills: 1 | Status: SHIPPED | OUTPATIENT
Start: 2023-08-03 | End: 2023-08-03

## 2023-08-03 RX ORDER — BUDESONIDE 90 UG/1
1 AEROSOL, POWDER RESPIRATORY (INHALATION) 2 TIMES DAILY
Qty: 9.64 EACH | Refills: 1 | Status: SHIPPED | OUTPATIENT
Start: 2023-08-03

## 2023-08-03 NOTE — PROGRESS NOTES
Name: Veronica Coleman      : 1995      MRN: [id number]  Encounter Provider: Irma Rodgers MD  Encounter Date: 8/3/2023   Encounter department: [facility]     1. Mild intermittent asthma with acute exacerbation  -     budesonide (Pulmicort Flexhaler) 90 MCG/ACT inhaler; Inhale 1 puff 2 (two) times a day Rinse mouth after use. 2. Anosmia  -     Ambulatory Referral to Otolaryngology; Future      Patient with 4-week history of asthma exacerbation with concomitant anosmia and decrease sensation of taste, although it is possible that this is secondary to seasonal change, increase humidity as well as patient's baseline nasal polyp acting up  This could also be secondary to COVID infection triggering asthma exacerbation anosmia  Patient did not take a COVID test at that time  Reports overall improvement in respiratory symptoms with Flovent, will prescribe Flovent 1 puff twice a day  Patient's last albuterol use overnight, no wheezing appreciated on physical exam  Please monitor how frequently you need to use albuterol, ideally we can get it lower than once a week    For follow-up regarding anosmia, will provide referral to ENT for evaluation         Subjective     HPI     50-year-old male patient presents for follow-up. Patient reports about 4 weeks ago he has noticed worsening asthma symptom, using albuterol almost every 4 hours and also started using Flovent inhaler. Patient reports stability symptom has improved however he would like to have a formal prescription for Flovent. Patient has been using 1 puff at night which seems to help with asthma symptoms, using albuterol about once a day right now. Patient continues to use singular 10 mg at night. Interestingly, patient also experienced anosmia as well as decrease sensation of taste starting about 4 weeks ago.   Patient did not test for COVID around that time however was traveling to [place] for work.  During the timeframe when symptoms started patient denies any significant upper respiratory symptoms other than chest tightness (he describes as his normal asthma symptoms). Denies any other individuals testing positive for COVID around him. Patient reports he does have a history of nasal polyp, concerned about this being the cause of his anosmia. Patient also uses mometasone furoate twice daily. Review of Systems   Constitutional: Negative for appetite change, chills and fever. HENT: Positive for postnasal drip and rhinorrhea. Negative for congestion and sore throat. Absence sense of smell and diminished taste   Respiratory: Positive for cough and chest tightness. Negative for shortness of breath. Cardiovascular: Negative for chest pain. Gastrointestinal: Negative for abdominal pain, constipation, diarrhea, nausea and vomiting. Allergic/Immunologic: Positive for environmental allergies. Neurological: Negative for headaches. Psychiatric/Behavioral: Positive for sleep disturbance (due to asthma exacerbation, improved).          Past Medical History:   Diagnosis Date   • Asthma    • Heart murmur     during childhood   • Nasal congestion    • Sinusitis    • Tonsillitis      Past Surgical History:   Procedure Laterality Date   • ADENOIDECTOMY     • COSMETIC SURGERY      Rhinoplasty   • POLYPECTOMY     • SEPTOPLASTY     • SINUS SURGERY     • TONSILLECTOMY       Family History   Problem Relation Age of Onset   • Mental illness Mother    • Heart disease Father    • Hyperlipidemia Father    • Hypertension Father    • Asthma Father    • Diabetes Maternal Grandmother    • Asthma Paternal Grandmother      Social History     Socioeconomic History   • Marital status: Single     Spouse name: None   • Number of children: None   • Years of education: None   • Highest education level: None   Occupational History   • None   Tobacco Use   • Smoking status: Never   • Smokeless tobacco: Never   • Tobacco comments:     tried before, less than 1 pack in total   Vaping Use   • Vaping Use: Former   Substance and Sexual Activity   • Alcohol use: Yes     Comment: 2 drinks a week    • Drug use: Not Currently     Frequency: 3.0 times per week     Types: Marijuana     Comment: sublingual lincture   • Sexual activity: Yes     Partners: Female   Other Topics Concern   • None   Social History Narrative    Patient feels safe at his home and at work    Has good access to transportation, food medicine    Has carbon monoxide detector, smoke detectors, all with seatbelt    Has history of concussion, always wear a helmet    Does have a gun, understands to sore firearm safety         Social Determinants of Health     Financial Resource Strain: Not on file   Food Insecurity: Not on file   Transportation Needs: Not on file   Physical Activity: Not on file   Stress: Not on file   Social Connections: Not on file   Intimate Partner Violence: Not on file   Housing Stability: Not on file     Current Outpatient Medications on File Prior to Visit   Medication Sig   • albuterol (Ventolin HFA) 90 mcg/act inhaler Inhale 2 puffs every 6 (six) hours as needed for wheezing or shortness of breath   • levocetirizine (XYZAL) 5 MG tablet Take 5 mg by mouth   • Mometasone Furoate POWD Compound Mometasone 1 mg capsule to be added to sinus rinse twice daily   • montelukast (SINGULAIR) 10 mg tablet Take 1 tablet (10 mg total) by mouth daily   • [DISCONTINUED] fluticasone (FLOVENT HFA) 110 MCG/ACT inhaler Inhale 1 puff daily at bedtime Rinse mouth after use. • [DISCONTINUED] Fluticasone-Salmeterol (Advair) 100-50 mcg/dose inhaler Inhale 1 puff 2 (two) times a day Rinse mouth after use.    • cetirizine (ZyrTEC) 10 mg tablet Take 1 tablet (10 mg total) by mouth daily at bedtime   • guaiFENesin (MUCINEX PO) Take by mouth (Patient not taking: Reported on 8/3/2023)   • naproxen (Naprosyn) 500 mg tablet Take 1 tablet (500 mg total) by mouth 2 (two) times a day with meals   • [DISCONTINUED] montelukast (SINGULAIR) 10 mg tablet Take 1 tablet (10 mg total) by mouth daily     Allergies   Allergen Reactions   • Other      Seasonal     Immunization History   Administered Date(s) Administered   • COVID-19 PFIZER VACCINE 0.3 ML IM 04/07/2021, 04/28/2021, 01/15/2022   • DTaP 5 01/03/1997, 03/03/1997, 05/02/1997, 05/22/1998, 10/30/2001   • HPV Quadrivalent 08/06/2015, 10/23/2015, 02/12/2016   • Hep B, adult 1995, 01/03/1997, 08/06/1997   • Hib (PRP-OMP) 01/03/1997, 03/03/1997, 05/02/1997, 05/22/1998   • IPV 01/03/1997, 03/03/1997, 05/02/1997, 10/30/2001   • MMR 10/14/1997, 11/30/2000   • Meningococcal B, OMV (BEXSERO) 03/30/2018, 09/07/2018   • Meningococcal, Unknown Serogroups 08/11/2008, 08/06/2015   • Pneumococcal Polysaccharide PPV23 09/07/2018   • TD (adult) Preservative Free 08/11/2008   • Tdap 09/07/2018   • Varicella 10/14/1997, 08/18/2009       Objective     /60 (BP Location: Left arm, Patient Position: Sitting, Cuff Size: Large)   Pulse 71   Temp 98 °F (36.7 °C) (Temporal)   Resp 16   Ht 6' 2.75" (1.899 m)   Wt 79.4 kg (175 lb)   SpO2 98%   BMI 22.02 kg/m²     Physical Exam  Vitals reviewed. Constitutional:       General: He is not in acute distress. Appearance: Normal appearance. He is not ill-appearing, toxic-appearing or diaphoretic. Cardiovascular:      Rate and Rhythm: Normal rate and regular rhythm. Pulses: Normal pulses. Heart sounds: Normal heart sounds. No murmur heard. Pulmonary:      Effort: Pulmonary effort is normal. No respiratory distress. Breath sounds: Normal breath sounds. Abdominal:      General: Abdomen is flat. Musculoskeletal:         General: No swelling or deformity. Skin:     General: Skin is warm and dry. Capillary Refill: Capillary refill takes less than 2 seconds. Neurological:      General: No focal deficit present. Mental Status: He is alert.    Psychiatric:         Mood and Affect: Mood normal.              Tani Reilly MD

## 2023-11-04 DIAGNOSIS — J45.21 MILD INTERMITTENT ASTHMA WITH ACUTE EXACERBATION: ICD-10-CM

## 2023-11-06 RX ORDER — ALBUTEROL SULFATE 90 UG/1
2 AEROSOL, METERED RESPIRATORY (INHALATION) EVERY 6 HOURS PRN
Qty: 16 G | Refills: 1 | Status: SHIPPED | OUTPATIENT
Start: 2023-11-06

## 2023-11-14 ENCOUNTER — OFFICE VISIT (OUTPATIENT)
Dept: FAMILY MEDICINE CLINIC | Facility: CLINIC | Age: 28
End: 2023-11-14
Payer: COMMERCIAL

## 2023-11-14 VITALS
RESPIRATION RATE: 16 BRPM | TEMPERATURE: 97.7 F | DIASTOLIC BLOOD PRESSURE: 74 MMHG | HEART RATE: 85 BPM | WEIGHT: 175 LBS | SYSTOLIC BLOOD PRESSURE: 112 MMHG | BODY MASS INDEX: 22.46 KG/M2 | HEIGHT: 74 IN

## 2023-11-14 DIAGNOSIS — J45.41 MODERATE PERSISTENT ASTHMA WITH ACUTE EXACERBATION: ICD-10-CM

## 2023-11-14 DIAGNOSIS — J32.9 CHRONIC SINUSITIS, UNSPECIFIED LOCATION: Primary | ICD-10-CM

## 2023-11-14 PROBLEM — Z00.00 ROUTINE GENERAL MEDICAL EXAMINATION AT HEALTH CARE FACILITY: Status: ACTIVE | Noted: 2018-09-07

## 2023-11-14 PROBLEM — B08.1 MOLLUSCUM CONTAGIOSUM: Status: ACTIVE | Noted: 2019-01-17

## 2023-11-14 PROCEDURE — 99214 OFFICE O/P EST MOD 30 MIN: CPT | Performed by: INTERNAL MEDICINE

## 2023-11-14 RX ORDER — CAMPHOR (NATURAL), EUCALYPTUS OIL, AND MENTHOL, UNSPECIFIED FORM 53; 13; 28 MG/G; MG/G; MG/G
OINTMENT TOPICAL
COMMUNITY

## 2023-11-14 RX ORDER — METHYLPREDNISOLONE 4 MG/1
TABLET ORAL
Qty: 21 EACH | Refills: 0 | Status: SHIPPED | OUTPATIENT
Start: 2023-11-14

## 2023-11-14 RX ORDER — AMOXICILLIN AND CLAVULANATE POTASSIUM 875; 125 MG/1; MG/1
1 TABLET, FILM COATED ORAL EVERY 12 HOURS SCHEDULED
Qty: 20 TABLET | Refills: 0 | Status: SHIPPED | OUTPATIENT
Start: 2023-11-14 | End: 2023-11-24

## 2023-11-15 ENCOUNTER — TELEPHONE (OUTPATIENT)
Age: 28
End: 2023-11-15

## 2023-11-15 NOTE — TELEPHONE ENCOUNTER
Sent order thru [facility]. Signature needed from provider, sent to [address] [place] [address],[address].

## 2023-11-15 NOTE — PROGRESS NOTES
Name: Ml Lei      : 1995      MRN: [id number]  Encounter Provider: Cleopatra Smith MD  Encounter Date: 2023   Encounter department: [facility]     1. Chronic sinusitis, unspecified location  -     methylPREDNISolone 4 MG tablet therapy pack; Use as directed on package  -     amoxicillin-clavulanate (AUGMENTIN) 875-125 mg per tablet; Take 1 tablet by mouth every 12 (twelve) hours for 10 days  -     Ambulatory Referral to Allergy; Future    2. Moderate persistent asthma with acute exacerbation  -     Nebulizers MISC; Use every 4 (four) hours as needed (SOB)  -     Ambulatory Referral to Allergy; Future        Depression Screening and Follow-up Plan: Patient was screened for depression during today's encounter. They screened negative with a PHQ-2 score of 0. Subjective      Patient has had long-term sinus congestion. He has had had multiple nasal polyps removed along with having seen an allergist in the distant past.  He has been on steroids multiple antibiotics without much success. Sinus Problem  This is a chronic problem. The current episode started more than 1 year ago. The problem has been waxing and waning since onset. There has been no fever. His pain is at a severity of 5/10. The pain is mild. Associated symptoms include congestion, coughing, headaches, shortness of breath, sinus pressure and a sore throat. Pertinent negatives include no chills or ear pain. Past treatments include acetaminophen, oral decongestants, saline nose sprays, nasal decongestants and sitting up. The treatment provided no relief. Review of Systems   Constitutional:  Positive for fatigue. Negative for chills and fever. HENT:  Positive for congestion, sinus pressure and sore throat. Negative for ear pain. Eyes:  Negative for pain and visual disturbance. Respiratory:  Positive for cough, shortness of breath and wheezing.     Cardiovascular:  Negative for chest pain and palpitations. Gastrointestinal:  Negative for abdominal pain and vomiting. Genitourinary:  Negative for dysuria and hematuria. Musculoskeletal:  Negative for arthralgias and back pain. Skin:  Negative for color change and rash. Neurological:  Positive for headaches. Negative for seizures and syncope. All other systems reviewed and are negative. Current Outpatient Medications on File Prior to Visit   Medication Sig    albuterol (Ventolin HFA) 90 mcg/act inhaler Inhale 2 puffs every 6 (six) hours as needed for wheezing or shortness of breath    budesonide (Pulmicort Flexhaler) 90 MCG/ACT inhaler Inhale 1 puff 2 (two) times a day Rinse mouth after use. Camphor-Eucalyptus-Menthol (Delsym Vapor Roll-on) 5.3-1.3-2.8 % OINT Apply topically    levocetirizine (XYZAL) 5 MG tablet Take 1 tablet (5 mg total) by mouth every evening    Mometasone Furoate POWD Compound Mometasone 1 mg capsule to be added to sinus rinse twice daily    montelukast (SINGULAIR) 10 mg tablet Take 1 tablet (10 mg total) by mouth daily    [DISCONTINUED] guaiFENesin (MUCINEX PO) Take by mouth (Patient not taking: Reported on 8/3/2023)    [DISCONTINUED] naproxen (Naprosyn) 500 mg tablet Take 1 tablet (500 mg total) by mouth 2 (two) times a day with meals       Objective     /74 (BP Location: Right arm, Patient Position: Sitting, Cuff Size: Large)   Pulse 85   Temp 97.7 °F (36.5 °C) (Temporal)   Resp 16   Ht 6' 2" (1.88 m)   Wt 79.4 kg (175 lb)   BMI 22.47 kg/m²     Physical Exam  Constitutional:       General: He is not in acute distress. Appearance: He is well-developed. HENT:      Head: Normocephalic. Right Ear: Tympanic membrane and external ear normal.      Left Ear: Tympanic membrane and external ear normal.      Nose: Congestion and rhinorrhea present. Mouth/Throat:      Mouth: Mucous membranes are moist.      Pharynx: No oropharyngeal exudate or posterior oropharyngeal erythema.    Eyes: Conjunctiva/sclera: Conjunctivae normal.      Pupils: Pupils are equal, round, and reactive to light. Neck:      Thyroid: No thyromegaly. Vascular: No JVD. Cardiovascular:      Rate and Rhythm: Normal rate and regular rhythm. Heart sounds: Normal heart sounds. No murmur heard. No gallop. Pulmonary:      Effort: Pulmonary effort is normal. No respiratory distress. Breath sounds: Wheezing present. No rhonchi or rales. Abdominal:      General: Bowel sounds are normal. There is no distension. Palpations: Abdomen is soft. There is no mass. Tenderness: There is no abdominal tenderness. Musculoskeletal:         General: No tenderness. Normal range of motion. Cervical back: Normal range of motion and neck supple. Lymphadenopathy:      Cervical: No cervical adenopathy. Skin:     Findings: No rash. Neurological:      Mental Status: He is alert and oriented to person, place, and time. Cranial Nerves: No cranial nerve deficit. Coordination: Coordination normal.   Psychiatric:         Behavior: Behavior normal.         Thought Content:  Thought content normal.         Judgment: Judgment normal.       Sen Nova MD

## 2023-11-15 NOTE — TELEPHONE ENCOUNTER
Patient called to see if Dr. Rona Manning had ordered his Nebulizer. I asked the patient to check with pharmacy, as there was a miscellaneous order sent for nebulizer yesterday.

## 2023-11-15 NOTE — TELEPHONE ENCOUNTER
Patient called in stating he spoke with [facility] and they cannot fill a script for a nebulizer it has to be sent through a medical supply.

## 2023-11-15 NOTE — ASSESSMENT & PLAN NOTE
Patient has chronic sinusitis and has been seeing ear nose and throat for same. He also is on numerous medications for his severe asthma. Unfortunately he still is getting marked nasal congestion with headaches and purulent discharge treat with steroids and Augmentin.   He has not seen allergy for many years so we will give him a referral to same

## 2023-11-16 LAB
DME PARACHUTE DELIVERY DATE REQUESTED: NORMAL
DME PARACHUTE ITEM DESCRIPTION: NORMAL
DME PARACHUTE ORDER STATUS: NORMAL
DME PARACHUTE SUPPLIER NAME: NORMAL
DME PARACHUTE SUPPLIER PHONE: NORMAL

## 2023-11-21 LAB

## 2024-01-13 PROBLEM — B08.1 MOLLUSCUM CONTAGIOSUM: Status: RESOLVED | Noted: 2019-01-17 | Resolved: 2024-01-13

## 2024-01-13 PROBLEM — Z00.00 ROUTINE GENERAL MEDICAL EXAMINATION AT HEALTH CARE FACILITY: Status: RESOLVED | Noted: 2018-09-07 | Resolved: 2024-01-13

## 2024-02-08 DIAGNOSIS — J45.21 MILD INTERMITTENT ASTHMA WITH ACUTE EXACERBATION: ICD-10-CM

## 2024-02-08 RX ORDER — BUDESONIDE 90 UG/1
1 AEROSOL, POWDER RESPIRATORY (INHALATION) 2 TIMES DAILY
Qty: 9.64 EACH | Refills: 1 | Status: SHIPPED | OUTPATIENT
Start: 2024-02-08

## 2024-02-08 RX ORDER — ALBUTEROL SULFATE 90 UG/1
2 AEROSOL, METERED RESPIRATORY (INHALATION) EVERY 6 HOURS PRN
Qty: 16 G | Refills: 1 | Status: SHIPPED | OUTPATIENT
Start: 2024-02-08

## 2024-03-04 DIAGNOSIS — J45.21 MILD INTERMITTENT ASTHMA WITH ACUTE EXACERBATION: ICD-10-CM

## 2024-03-04 RX ORDER — BUDESONIDE 90 UG/1
AEROSOL, POWDER RESPIRATORY (INHALATION)
Qty: 3 EACH | Refills: 1 | Status: SHIPPED | OUTPATIENT
Start: 2024-03-04

## 2024-05-02 DIAGNOSIS — J45.21 MILD INTERMITTENT ASTHMA WITH ACUTE EXACERBATION: ICD-10-CM

## 2024-05-03 RX ORDER — ALBUTEROL SULFATE 90 UG/1
AEROSOL, METERED RESPIRATORY (INHALATION)
Qty: 8.5 G | Refills: 5 | Status: SHIPPED | OUTPATIENT
Start: 2024-05-03

## 2024-11-07 ENCOUNTER — OFFICE VISIT (OUTPATIENT)
Dept: FAMILY MEDICINE CLINIC | Facility: CLINIC | Age: 29
End: 2024-11-07
Payer: COMMERCIAL

## 2024-11-07 VITALS
BODY MASS INDEX: 22.43 KG/M2 | SYSTOLIC BLOOD PRESSURE: 122 MMHG | WEIGHT: 174.8 LBS | TEMPERATURE: 98 F | HEART RATE: 82 BPM | OXYGEN SATURATION: 99 % | DIASTOLIC BLOOD PRESSURE: 82 MMHG | HEIGHT: 74 IN

## 2024-11-07 DIAGNOSIS — H61.23 BILATERAL IMPACTED CERUMEN: ICD-10-CM

## 2024-11-07 DIAGNOSIS — J45.41 MODERATE PERSISTENT ASTHMA WITH ACUTE EXACERBATION: Primary | ICD-10-CM

## 2024-11-07 PROCEDURE — 99213 OFFICE O/P EST LOW 20 MIN: CPT | Performed by: FAMILY MEDICINE

## 2024-11-07 RX ORDER — AZITHROMYCIN 250 MG/1
TABLET, FILM COATED ORAL
Qty: 6 TABLET | Refills: 0 | Status: SHIPPED | OUTPATIENT
Start: 2024-11-07 | End: 2024-11-12

## 2024-11-07 RX ORDER — PREDNISONE 20 MG/1
40 TABLET ORAL DAILY
Qty: 10 TABLET | Refills: 0 | Status: SHIPPED | OUTPATIENT
Start: 2024-11-07 | End: 2024-11-12

## 2024-11-07 NOTE — ASSESSMENT & PLAN NOTE
Orders:    predniSONE 20 mg tablet; Take 2 tablets (40 mg total) by mouth daily for 5 days    azithromycin (Zithromax) 250 mg tablet; Take 2 tablets (500 mg total) by mouth daily for 1 day, THEN 1 tablet (250 mg total) daily for 4 days.    XR chest pa and lateral; Future

## 2024-11-07 NOTE — PROGRESS NOTES
Outpatient Progress Note  Name: Hayder Beckman      : 1995      MRN: [id number]  Encounter Provider: Johann Sarmiento MD  Encounter Date: 2024   Encounter department: [facility]    Assessment & Plan  Moderate persistent asthma with acute exacerbation    Orders:    predniSONE 20 mg tablet; Take 2 tablets (40 mg total) by mouth daily for 5 days    azithromycin (Zithromax) 250 mg tablet; Take 2 tablets (500 mg total) by mouth daily for 1 day, THEN 1 tablet (250 mg total) daily for 4 days.    XR chest pa and lateral; Future    Bilateral impacted cerumen    Orders:    carbamide peroxide (DEBROX) 6.5 % otic solution; Administer 5 drops into both ears 2 (two) times a day    End expiratory rales can be appreciated on the left lower quadrant, concern for possible mucous plug, will have patient try prednisone and azithromycin, if there is significant shortness of breath despite medication or if patient spikes a fever please complete chest x-ray for evaluation of pneumonia, in that case we may try a longer course of antibiotic    Disposition:     I have spent a total time of 15 minutes on the day of the encounter for this patient including       Depression Screening and Follow-up Plan: Patient was screened for depression during today's encounter. They screened negative with a PHQ-2 score of 0.        Encounter provider: Johann Sarmiento MD     Provider located at: [address] [address] [phone]     Recent Visits  No visits were found meeting these conditions.  Showing recent visits within past 7 days and meeting all other requirements  Today's Visits  Date Type Provider Dept   24 Office Visit Johann Sarmiento MD [facility]   Showing today's visits and meeting all other requirements  Future Appointments  No visits were found meeting these conditions.  Showing future appointments within next 150 days and meeting all  "other requirements    History of Present Illness      Subjective:   Hayder Beckman is a 29 y.o. male who is concerned about COVID-19. Patient's symptoms include fatigue, nasal congestion, sore throat and chest tightness. Patient denies fever, chills, malaise, rhinorrhea, anosmia, loss of taste, cough, shortness of breath, abdominal pain, nausea, vomiting, diarrhea, myalgias and headaches.     - Date of symptom onset: 10/24/2024      COVID-19 vaccination status: Fully vaccinated with booster    Exposure:   Contact with a person who is under investigation (PUI) for or who is positive for COVID-19 within the last 14 days?: No    Hospitalized recently for fever and/or lower respiratory symptoms?: No      Currently a healthcare worker that is involved in direct patient care?: No      Works in a special setting where the risk of COVID-19 transmission may be high? (this may include long-term care, correctional and [facility] facilities; homeless shelters; assisted-living facilities and group homes.): No      Resident in a special setting where the risk of COVID-19 transmission may be high? (this may include long-term care, correctional and [facility] facilities; homeless shelters; assisted-living facilities and group homes.): No      Increased albuterol use, worsening asthma symptoms, rattling while breathing    No results found for: \"SARSCOV2\", \"[id number]\", \"SARSCORONAVI\", \"CORONAVIRUSR\", \"SARSCOVAG\", \"SARSCOVAGH\"    Review of Systems   Constitutional:  Positive for fatigue. Negative for chills and fever.   HENT:  Positive for congestion and sore throat. Negative for rhinorrhea.    Respiratory:  Positive for chest tightness. Negative for cough and shortness of breath.    Gastrointestinal:  Negative for abdominal pain, diarrhea, nausea and vomiting.   Musculoskeletal:  Negative for myalgias.   Neurological:  Negative for headaches.     Objective     /82 (BP Location: Left arm, Patient Position: Sitting, Cuff " "Size: Large)   Pulse 82   Temp 98 °F (36.7 °C) (Temporal)   Ht 6' 2\" (1.88 m)   Wt 79.3 kg (174 lb 12.8 oz)   SpO2 99%   BMI 22.44 kg/m²     Physical Exam  Vitals reviewed.   Constitutional:       Appearance: Normal appearance.   HENT:      Right Ear: There is impacted cerumen.      Left Ear: There is impacted cerumen.   Cardiovascular:      Rate and Rhythm: Normal rate.   Pulmonary:      Breath sounds: Wheezing present.      Comments: Rattling on expiration noticeable especially in the left lower lung  Abdominal:      General: Abdomen is flat.   Musculoskeletal:         General: No swelling.   Skin:     General: Skin is warm and dry.      Capillary Refill: Capillary refill takes less than 2 seconds.      Coloration: Skin is not jaundiced.   Neurological:      General: No focal deficit present.      Mental Status: He is alert.   Psychiatric:         Mood and Affect: Mood normal.           Johann Sarmiento M.D.  Family Medicine    Please excuse any \"sound-alike\" errors that may have ocurred during the process of dictation. Parts of this note have been dictated and there may be errors present in the transcription process. Thank you.    "

## 2024-12-09 ENCOUNTER — OFFICE VISIT (OUTPATIENT)
Dept: FAMILY MEDICINE CLINIC | Facility: CLINIC | Age: 29
End: 2024-12-09
Payer: COMMERCIAL

## 2024-12-09 VITALS
TEMPERATURE: 97.8 F | WEIGHT: 172.6 LBS | DIASTOLIC BLOOD PRESSURE: 74 MMHG | HEIGHT: 74 IN | OXYGEN SATURATION: 98 % | SYSTOLIC BLOOD PRESSURE: 126 MMHG | BODY MASS INDEX: 22.15 KG/M2 | HEART RATE: 72 BPM

## 2024-12-09 DIAGNOSIS — Z13.220 LIPID SCREENING: ICD-10-CM

## 2024-12-09 DIAGNOSIS — Z00.00 ANNUAL PHYSICAL EXAM: Primary | ICD-10-CM

## 2024-12-09 DIAGNOSIS — R53.83 OTHER FATIGUE: ICD-10-CM

## 2024-12-09 PROCEDURE — 99395 PREV VISIT EST AGE 18-39: CPT | Performed by: FAMILY MEDICINE

## 2024-12-09 NOTE — PATIENT INSTRUCTIONS
"Patient Education     Routine physical for adults   The Basics   Written by the doctors and editors at [facility]   What is a physical? -- A physical is a routine visit, or \"check-up,\" with your doctor. You might also hear it called a \"wellness visit\" or \"preventive visit.\"  During each visit, the doctor will:   Ask about your physical and mental health   Ask about your habits, behaviors, and lifestyle   Do an exam   Give you vaccines if needed   Talk to you about any medicines you take   Give advice about your health   Answer your questions  Getting regular check-ups is an important part of taking care of your health. It can help your doctor find and treat any problems you have. But it's also important for preventing health problems.  A routine physical is different from a \"sick visit.\" A sick visit is when you see a doctor because of a health concern or problem. Since physicals are scheduled ahead of time, you can think about what you want to ask the doctor.  How often should I get a physical? -- It depends on your age and health. In general, for people age 21 years and older:   If you are younger than 50 years, you might be able to get a physical every 3 years.   If you are 50 years or older, your doctor might recommend a physical every year.  If you have an ongoing health condition, like diabetes or high blood pressure, your doctor will probably want to see you more often.  What happens during a physical? -- In general, each visit will include:   Physical exam - The doctor or nurse will check your height, weight, heart rate, and blood pressure. They will also look at your eyes and ears. They will ask about how you are feeling and whether you have any symptoms that bother you.   Medicines - It's a good idea to bring a list of all the medicines you take to each doctor visit. Your doctor will talk to you about your medicines and answer any questions. Tell them if you are having any side effects that bother you. You " "should also tell them if you are having trouble paying for any of your medicines.   Habits and behaviors - This includes:   Your diet   Your exercise habits   Whether you smoke, drink alcohol, or use drugs   Whether you are sexually active   Whether you feel safe at home  Your doctor will talk to you about things you can do to improve your health and lower your risk of health problems. They will also offer help and support. For example, if you want to quit smoking, they can give you advice and might prescribe medicines. If you want to improve your diet or get more physical activity, they can help you with this, too.   Lab tests, if needed - The tests you get will depend on your age and situation. For example, your doctor might want to check your:   Cholesterol   Blood sugar   Iron level   Vaccines - The recommended vaccines will depend on your age, health, and what vaccines you already had. Vaccines are very important because they can prevent certain serious or deadly infections.   Discussion of screening - \"Screening\" means checking for diseases or other health problems before they cause symptoms. Your doctor can recommend screening based on your age, risk, and preferences. This might include tests to check for:   Cancer, such as breast, prostate, cervical, ovarian, colorectal, prostate, lung, or skin cancer   Sexually transmitted infections, such as chlamydia and gonorrhea   Mental health conditions like depression and anxiety  Your doctor will talk to you about the different types of screening tests. They can help you decide which screenings to have. They can also explain what the results might mean.   Answering questions - The physical is a good time to ask the doctor or nurse questions about your health. If needed, they can refer you to other doctors or specialists, too.  Adults older than 65 years often need other care, too. As you get older, your doctor will talk to you about:   How to prevent falling at " home   Hearing or vision tests   Memory testing   How to take your medicines safely   Making sure that you have the help and support you need at home  All topics are updated as new evidence becomes available and our peer review process is complete.  This topic retrieved from [employer] on: May 02, 2024.  Topic 753704 Version 1.0  Release: 32.4.3 - C32.122  © 2024 UpToDate, Inc. and/or its affiliates. All rights reserved.  Consumer Information Use and Disclaimer   Disclaimer: This generalized information is a limited summary of diagnosis, treatment, and/or medication information. It is not meant to be comprehensive and should be used as a tool to help the user understand and/or assess potential diagnostic and treatment options. It does NOT include all information about conditions, treatments, medications, side effects, or risks that may apply to a specific patient. It is not intended to be medical advice or a substitute for the medical advice, diagnosis, or treatment of a health care provider based on the health care provider's examination and assessment of a patient's specific and unique circumstances. Patients must speak with a health care provider for complete information about their health, medical questions, and treatment options, including any risks or benefits regarding use of medications. This information does not endorse any treatments or medications as safe, effective, or approved for treating a specific patient. UpToDate, Inc. and its affiliates disclaim any warranty or liability relating to this information or the use thereof.The use of this information is governed by the Terms of Use, available at https://www.[employer].com/en/know/clinical-effectiveness-terms. 2024© UpToDate, Inc. and its affiliates and/or licensors. All rights reserved.  Copyright   © 2024 UpToDate, Inc. and/or its affiliates. All rights reserved.

## 2024-12-09 NOTE — PROGRESS NOTES
Adult Annual Physical  Name: Hayder Beckman      : 1995      MRN: [id number]  Encounter Provider: Johann Sarmiento MD  Encounter Date: 2024   Encounter department: [facility]    Assessment & Plan  Annual physical exam    Orders:    Comprehensive metabolic panel; Future    Lipid panel; Future    Lipid screening    Orders:    Comprehensive metabolic panel; Future    Lipid panel; Future    Other fatigue    Orders:    Comprehensive metabolic panel; Future    Lipid panel; Future      Immunizations and preventive care screenings were discussed with patient today. Appropriate education was printed on patient's after visit summary.    Counseling:  Alcohol/drug use: discussed moderation in alcohol intake, the recommendations for healthy alcohol use, and avoidance of illicit drug use.  Dental Health: discussed importance of regular tooth brushing, flossing, and dental visits.  Injury prevention: discussed safety/seat belts, safety helmets, smoke detectors, carbon monoxide detectors, and smoking near bedding or upholstery.  Sexual health: discussed sexually transmitted diseases, partner selection, use of condoms, avoidance of unintended pregnancy, and contraceptive alternatives.  Exercise: the importance of regular exercise/physical activity was discussed. Recommend exercise 3-5 times per week for at least 30 minutes.          History of Present Illness       Adult Annual Physical:  Patient presents for annual physical. Pt reports subjective improvement in respiratory symptoms  .     Diet and Physical Activity:  - Diet/Nutrition: well balanced diet and consuming 3-5 servings of fruits/vegetables daily.  - Exercise: 3-4 times a week on average.    General Health:  - Sleep: sleeps well.  - Hearing: normal hearing bilateral ears.  - Vision: no vision problems.  - Dental: brushes teeth twice daily and regular dental visits.     Health:  - History of STDs: no.   - Urinary symptoms: none.  "    Advanced Care Planning:  - Has an advanced directive?: no    - Has a durable medical POA?: no    - ACP document given to patient?: no        Review of Systems   Constitutional:  Negative for chills, fever and unexpected weight change.   HENT:  Negative for congestion.    Respiratory:  Negative for chest tightness and shortness of breath.    Cardiovascular:  Negative for chest pain.   Gastrointestinal:  Negative for abdominal pain and vomiting.   Neurological:  Negative for dizziness, light-headedness and headaches.   Psychiatric/Behavioral:  Negative for sleep disturbance.          Objective   /74 (BP Location: Right arm, Patient Position: Sitting, Cuff Size: Large)   Pulse 72   Temp 97.8 °F (36.6 °C) (Temporal)   Ht 6' 2\" (1.88 m)   Wt 78.3 kg (172 lb 9.6 oz)   SpO2 98%   BMI 22.16 kg/m²     Physical Exam  Vitals reviewed.   Constitutional:       Appearance: Normal appearance.   Cardiovascular:      Rate and Rhythm: Normal rate.      Pulses: Normal pulses.   Pulmonary:      Effort: Pulmonary effort is normal.   Abdominal:      General: Abdomen is flat.   Musculoskeletal:         General: No swelling, tenderness, deformity or signs of injury.   Skin:     General: Skin is warm and dry.      Capillary Refill: Capillary refill takes less than 2 seconds.      Coloration: Skin is not jaundiced.   Neurological:      General: No focal deficit present.      Mental Status: He is alert.   Psychiatric:         Mood and Affect: Mood normal.         Johann Sarmiento M.D.  Family Medicine    Please excuse any \"sound-alike\" errors that may have ocurred during the process of dictation. Parts of this note have been dictated and there may be errors present in the transcription process. Thank you.    "

## 2025-02-11 ENCOUNTER — OFFICE VISIT (OUTPATIENT)
Dept: URGENT CARE | Facility: MEDICAL CENTER | Age: 30
End: 2025-02-11
Payer: COMMERCIAL

## 2025-02-11 VITALS
TEMPERATURE: 101 F | DIASTOLIC BLOOD PRESSURE: 74 MMHG | SYSTOLIC BLOOD PRESSURE: 143 MMHG | OXYGEN SATURATION: 98 % | RESPIRATION RATE: 18 BRPM | HEART RATE: 94 BPM

## 2025-02-11 DIAGNOSIS — J45.21 MILD INTERMITTENT ASTHMA WITH ACUTE EXACERBATION: ICD-10-CM

## 2025-02-11 DIAGNOSIS — J11.1 FLU: Primary | ICD-10-CM

## 2025-02-11 PROCEDURE — S9083 URGENT CARE CENTER GLOBAL: HCPCS | Performed by: PHYSICIAN ASSISTANT

## 2025-02-11 PROCEDURE — G0383 LEV 4 HOSP TYPE B ED VISIT: HCPCS | Performed by: PHYSICIAN ASSISTANT

## 2025-02-11 RX ORDER — AZITHROMYCIN 250 MG/1
TABLET, FILM COATED ORAL
Qty: 6 TABLET | Refills: 0 | Status: SHIPPED | OUTPATIENT
Start: 2025-02-11 | End: 2025-02-15

## 2025-02-11 RX ORDER — ACETAMINOPHEN 325 MG/1
650 TABLET ORAL EVERY 6 HOURS PRN
COMMUNITY

## 2025-02-11 RX ORDER — PREDNISONE 20 MG/1
40 TABLET ORAL DAILY
Qty: 10 TABLET | Refills: 0 | Status: SHIPPED | OUTPATIENT
Start: 2025-02-11 | End: 2025-02-16

## 2025-02-11 NOTE — PROGRESS NOTES
[facility]        NAME: Hayder Beckman is a 29 y.o. male  : 1995    MRN: [id number]  DATE: 2025  TIME: 6:27 PM    Assessment and Plan   Flu [J11.1]  1. Flu        2. Mild intermittent asthma with acute exacerbation              Patient Instructions     Flu   Asthma exacerbation  Prednisone 40 mg daily x 5 days  If no improvement in 2 days start antibiotic  Follow up with PCP in 3-5 days.  Proceed to  ER if symptoms worsen.    Chief Complaint     Chief Complaint   Patient presents with    Influenza     Pt tested positive for influenza A yesterday at home. Scratchy throat started Saturday. Fever, fatigue, body aches, painful cough, chills, PND, chest congestion started .         History of Present Illness       29-year-old male with past medical history of asthma presents complaining of generalized bodyaches, fevers, chills, wheezing, fevers Tmax 103 x 4 days.  Patient states that he has been taking over-the-counter medications with minimal relief.  Did test positive for flu at home.  Denies chest pain.        Review of Systems   Review of Systems   Constitutional:  Positive for chills and fever.   HENT:  Positive for congestion, postnasal drip and rhinorrhea. Negative for dental problem, drooling, ear pain, sinus pain, sore throat, trouble swallowing and voice change.    Eyes: Negative.    Respiratory:  Positive for cough and wheezing. Negative for apnea, choking, chest tightness, shortness of breath and stridor.    Cardiovascular: Negative.  Negative for chest pain.         Current Medications       Current Outpatient Medications:     acetaminophen (TYLENOL) 325 mg tablet, Take 650 mg by mouth every 6 (six) hours as needed for mild pain, Disp: , Rfl:     albuterol (2.5 mg/3 mL) 0.083 % nebulizer solution, , Disp: , Rfl:     albuterol (PROVENTIL HFA,VENTOLIN HFA) 90 mcg/act inhaler, TAKE 2 PUFFS BY MOUTH EVERY 6 HOURS AS NEEDED FOR WHEEZE OR FOR SHORTNESS OF BREATH, Disp:  8.5 g, Rfl: 5    levocetirizine (XYZAL) 5 MG tablet, TAKE 1 TABLET BY MOUTH EVERY DAY IN THE EVENING, Disp: 90 tablet, Rfl: 0    Mometasone Furoate POWD, Compound Mometasone 1 mg capsule to be added to sinus rinse twice daily, Disp: 180 g, Rfl: 3    montelukast (SINGULAIR) 10 mg tablet, TAKE 1 TABLET BY MOUTH EVERY DAY, Disp: 90 tablet, Rfl: 3    Nebulizers MISC, Use every 4 (four) hours as needed (SOB), Disp: 1 each, Rfl: 0    budesonide (Pulmicort Flexhaler) 90 MCG/ACT inhaler, INHALE 1 PUFF 2 TIMES A DAY RINSE MOUTH AFTER USE. (Patient not taking: Reported on 2/11/2025), Disp: 3 each, Rfl: 1    Camphor-Eucalyptus-Menthol (Delsym Vapor Roll-on) 5.3-1.3-2.8 % OINT, Apply topically (Patient not taking: Reported on 2/11/2025), Disp: , Rfl:     carbamide peroxide (DEBROX) 6.5 % otic solution, Administer 5 drops into both ears 2 (two) times a day (Patient not taking: Reported on 2/11/2025), Disp: 15 mL, Rfl: 0    Current Allergies     Allergies as of 02/11/2025 - Reviewed 02/11/2025   Allergen Reaction Noted    Other  12/30/2020            The following portions of the patient's history were reviewed and updated as appropriate: allergies, current medications, past family history, past medical history, past social history, past surgical history and problem list.     Past Medical History:   Diagnosis Date    Allergic forever    seasonal pollen    Asthma     Heart murmur     during childhood    Nasal congestion     Sinusitis     Tonsillitis        Past Surgical History:   Procedure Laterality Date    ADENOIDECTOMY      COSMETIC SURGERY      Rhinoplasty    FRACTURE SURGERY  Fall 2014    Fracture to cheek bone    POLYPECTOMY      SEPTOPLASTY      SINUS SURGERY      TONSILLECTOMY         Family History   Problem Relation Age of Onset    Mental illness Mother     Heart disease Father     Hyperlipidemia Father     Hypertension Father     Asthma Father     Diabetes Maternal Grandmother     Asthma Paternal Grandmother           Medications have been verified.        Objective   /74   Pulse 94   Temp (!) 101 °F (38.3 °C)   Resp 18   SpO2 98%        Physical Exam     Physical Exam  Constitutional:       General: He is not in acute distress.     Appearance: Normal appearance. He is well-developed. He is not diaphoretic.   HENT:      Head: Normocephalic and atraumatic.      Right Ear: Hearing, tympanic membrane, ear canal and external ear normal.      Left Ear: Hearing, tympanic membrane, ear canal and external ear normal.      Nose: Rhinorrhea present.      Right Sinus: No maxillary sinus tenderness or frontal sinus tenderness.      Left Sinus: No maxillary sinus tenderness or frontal sinus tenderness.      Mouth/Throat:      Mouth: Mucous membranes are moist.      Pharynx: Oropharynx is clear. Uvula midline. No oropharyngeal exudate or posterior oropharyngeal erythema.   Cardiovascular:      Rate and Rhythm: Normal rate and regular rhythm.      Heart sounds: Normal heart sounds.   Pulmonary:      Effort: Pulmonary effort is normal. No respiratory distress.      Breath sounds: No stridor. Wheezing present. No rhonchi or rales.   Chest:      Chest wall: No tenderness.   Musculoskeletal:      Cervical back: Normal range of motion and neck supple.   Lymphadenopathy:      Cervical: No cervical adenopathy.   Neurological:      Mental Status: He is alert.

## 2025-02-11 NOTE — PATIENT INSTRUCTIONS
Flu   Asthma exacerbation  Prednisone 40 mg daily x 5 days  If no improvement in 2 days start antibiotic  Follow up with PCP in 3-5 days.  Proceed to  ER if symptoms worsen.

## 2025-02-11 NOTE — LETTER
February 11, 2025     Patient: Hayder Beckman   YOB: 1995   Date of Visit: 2/11/2025       To Whom it May Concern:    Hayder Beckman was seen in my clinic on 2/11/2025. He may return to work when he is fever free x 24 hours without fever reducing medication.    If you have any questions or concerns, please don't hesitate to call.         Sincerely,          Kelvin Preciado PA-C        CC: No Recipients

## 2025-04-10 DIAGNOSIS — J45.21 MILD INTERMITTENT ASTHMA WITH ACUTE EXACERBATION: ICD-10-CM

## 2025-04-11 RX ORDER — BUDESONIDE 90 UG/1
1 AEROSOL, POWDER RESPIRATORY (INHALATION) 2 TIMES DAILY
Qty: 3 EACH | Refills: 2 | Status: SHIPPED | OUTPATIENT
Start: 2025-04-11

## 2025-04-11 RX ORDER — ALBUTEROL SULFATE 90 UG/1
2 INHALANT RESPIRATORY (INHALATION) EVERY 6 HOURS PRN
Qty: 8.5 G | Refills: 2 | Status: SHIPPED | OUTPATIENT
Start: 2025-04-11